# Patient Record
Sex: MALE | Race: WHITE | Employment: OTHER | URBAN - METROPOLITAN AREA
[De-identification: names, ages, dates, MRNs, and addresses within clinical notes are randomized per-mention and may not be internally consistent; named-entity substitution may affect disease eponyms.]

---

## 2017-01-03 ENCOUNTER — CLINICAL SUPPORT (OUTPATIENT)
Dept: CARDIOLOGY CLINIC | Age: 82
End: 2017-01-03

## 2017-01-03 DIAGNOSIS — Z79.01 LONG TERM (CURRENT) USE OF ANTICOAGULANTS: Primary | ICD-10-CM

## 2017-01-03 DIAGNOSIS — I48.0 PAROXYSMAL ATRIAL FIBRILLATION (HCC): ICD-10-CM

## 2017-01-03 LAB
INR BLD: 3.1
PT POC: 39.8 SEC
VALID INTERNAL CONTROL?: YES

## 2017-02-06 ENCOUNTER — CLINICAL SUPPORT (OUTPATIENT)
Dept: CARDIOLOGY CLINIC | Age: 82
End: 2017-02-06

## 2017-02-06 ENCOUNTER — OFFICE VISIT (OUTPATIENT)
Dept: CARDIOLOGY CLINIC | Age: 82
End: 2017-02-06

## 2017-02-06 VITALS
RESPIRATION RATE: 18 BRPM | SYSTOLIC BLOOD PRESSURE: 140 MMHG | OXYGEN SATURATION: 96 % | WEIGHT: 203 LBS | BODY MASS INDEX: 30.06 KG/M2 | HEART RATE: 75 BPM | DIASTOLIC BLOOD PRESSURE: 60 MMHG

## 2017-02-06 DIAGNOSIS — I87.2 VENOUS INSUFFICIENCY: ICD-10-CM

## 2017-02-06 DIAGNOSIS — I50.32 CHRONIC DIASTOLIC HEART FAILURE (HCC): ICD-10-CM

## 2017-02-06 DIAGNOSIS — Z95.0 PACEMAKER: ICD-10-CM

## 2017-02-06 DIAGNOSIS — I11.0 BENIGN HYPERTENSIVE HEART DISEASE WITH HEART FAILURE (HCC): ICD-10-CM

## 2017-02-06 DIAGNOSIS — Z79.01 LONG TERM (CURRENT) USE OF ANTICOAGULANTS: Primary | ICD-10-CM

## 2017-02-06 DIAGNOSIS — I25.10 CORONARY ARTERY DISEASE INVOLVING NATIVE CORONARY ARTERY OF NATIVE HEART WITHOUT ANGINA PECTORIS: Primary | Chronic | ICD-10-CM

## 2017-02-06 DIAGNOSIS — E78.5 DYSLIPIDEMIA: ICD-10-CM

## 2017-02-06 DIAGNOSIS — I48.0 PAROXYSMAL ATRIAL FIBRILLATION (HCC): ICD-10-CM

## 2017-02-06 DIAGNOSIS — N18.30 CHRONIC RENAL DISEASE, STAGE III (HCC): Chronic | ICD-10-CM

## 2017-02-06 LAB
INR BLD: 3
PT POC: 41.8 SEC
VALID INTERNAL CONTROL?: YES

## 2017-02-06 NOTE — MR AVS SNAPSHOT
Visit Information Date & Time Provider Department Dept. Phone Encounter #  
 2/6/2017  3:00 PM Zion Campoverde MD [facility] Aleksandra Fix [phone] [id number] Follow-up Instructions Return in about 6 months (around 8/6/2017). Your Appointments 2/6/2017  3:00 PM  
ESTABLISHED PATIENT with Zion Campoverde MD  
[facility] ([facility] SCHEDULING) Appt Note: 4 mo fup  
 [address] [facility] [address] [facility] [address]  
[phone]  
  
   
 [address] [address]  
  
    
 2/6/2017  3:20 PM  
COUMADIN CLINIC with COUMADIN, STFRANCIS  
[facility] Appt Note: f/u sov$0 crf 01/03/17  
 [address] [facility] [address]  
[phone]  
  
   
 [address] [address]  
  
    
 3/1/2017  2:40 PM  
COUMADIN CLINIC with COUMADIN, STFRANCIS  
[facility] ([facility]) Appt Note: f/u sov$0 crf 02/06/17  
 [address]  
[phone]  
  
    
 3/1/2017  2:45 PM  
PACEMAKER with PACEMAKER, STFRANCES  
[facility] ([facility] SCHEDULING) Appt Note: stj thresholds/stf/b 12-5-16  
 354 [facility] [address]  
[phone]  
  
   
 354 [facility] [address] [place] [address]  
  
    
 3/1/2017  3:20 PM  
ESTABLISHED PATIENT with Dano Felix MD  
[facility] ([facility] Appt Note: 6 month follow up  
 [address]  
54 [facility] [address] [address] Upcoming Health Maintenance Date Due DTaP/Tdap/Td series (1 - Tdap) 2/23/1953 ZOSTER VACCINE AGE 60> 2/23/1992 GLAUCOMA SCREENING Q2Y 2/23/1997 MEDICARE YEARLY EXAM 2/23/1997 Pneumococcal 65+ High/Highest Risk (2 of 2 - PPSV23) 1/1/2011 INFLUENZA AGE 9 TO ADULT 8/1/2016 Allergies as of 2/6/2017  Review Complete On: 2/6/2017 By: Radha Thakkar LPN Severity Noted Reaction Type Reactions Iron High 03/07/2013    Anaphylaxis Bumex [Bumetanide]  09/14/2010   Systemic Rash Current Immunizations  Reviewed on 3/5/2011 Name Date Influenza Vaccine Whole 11/1/2010 Pneumococcal Vaccine (Unspecified Type) 1/1/2006 Not reviewed this visit You Were Diagnosed With   
  
 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 
ICD-9-CM: 428.32 Venous insufficiency     ICD-10-CM: I87.2 ICD-9-CM: 459.81 Vitals BP Pulse Resp Weight(growth percentile) SpO2 BMI  
 140/60 (BP 1 Location: Left arm, BP Patient Position: At rest) 75 18 203 lb (92.1 kg) 96% 30.06 kg/m2 Smoking Status Former Smoker Vitals History BMI and BSA Data Body Mass Index Body Surface Area 30.06 kg/m 2 2.12 m 2 Preferred Pharmacy Pharmacy Name Phone [address] [facility] [phone] Your Updated Medication List  
  
   
This list is accurate as of: 2/6/17  2:37 PM.  Always use your most recent med list.  
  
  
  
  
 aspirin 81 mg tablet Take  by mouth daily. calcitRIOL 0.25 mcg capsule Commonly known as:  ROCALTROL Take 0.25 mcg by mouth daily. cloNIDine HCl 0.2 mg tablet Commonly known as:  CATAPRES Take 1 Tab by mouth two (2) times a day. diphenhydrAMINE 25 mg capsule Commonly known as:  BENADRYL Take 25 mg by mouth nightly as needed. ethacrynic acid 25 mg tablet Commonly known as:  EDECRIN Take 50 mg by mouth daily. FERROUS SULFATE Take 65 mg by mouth. furosemide 40 mg tablet Commonly known as:  LASIX Take 0.5 Tabs by mouth daily. Indications: PULMONARY EDEMA DUE TO CHRONIC HEART FAILURE  
  
 labetalol 200 mg tablet Commonly known as:  Marlane Angelucci Take 1 Tab by mouth two (2) times a day. levothyroxine 50 mcg tablet Commonly known as:  SYNTHROID Take  by mouth Daily (before breakfast). losartan 25 mg tablet Commonly known as:  COZAAR Take 50 mg by mouth daily. SPS (WITH SORBITOL) 15 gram/60 mL suspension Generic drug:  sodium polystyrene Take 15 g by mouth See Admin Instructions. 3 times weekly VITAMIN D3 1,000 unit tablet Generic drug:  cholecalciferol Take 50,000 Units by mouth daily. 50,000 iu every two weeks  
  
 warfarin 5 mg tablet Commonly known as:  JANTOVEN  
TAKE 1 TABLET BY MOUTH DAILY. OR AS DIRECTED  
  
 ZOCOR 20 mg tablet Generic drug:  simvastatin Take  by mouth nightly. Follow-up Instructions Return in about 6 months (around 8/6/2017). Introducing \[facility] [place] [facility]\"" & HEALTH SERVICES! Joanne Jackson introduces anfix patient portal. Now you can access parts of your medical record, email your doctor's office, and request medication refills online. 1. In your internet browser, go to [email or web address]). [email or web address] ([email or web address]) 2. Click on the First Time User? Click Here link in the Sign In box. You will see the New Member Sign Up page. 3. Enter your [employer] Access Code exactly as it appears below. You will not need to use this code after youve completed the sign-up process. If you do not sign up before the expiration date, you must request a new code. · anfix Access Code: [email or web address] Expires: 5/7/2017  2:36 PM 
 
 4. Enter the last four digits of your Social Security Number (xxxx) and Date of Birth (mm/dd/yyyy) as indicated and click Submit. You will be taken to the next sign-up page. 5. Create a [employer] ID. This will be your [employer] login ID and cannot be changed, so think of one that is secure and easy to remember. 6. Create a [employer] password. You can change your password at any time. 7. Enter your Password Reset Question and Answer. This can be used at a later time if you forget your password. 8. Enter your e-mail address. You will receive e-mail notification when new information is available in [address] 9. Click Sign Up. You can now view and download portions of your medical record. 10. Click the Download Summary menu link to download a portable copy of your medical information. If you have questions, please visit the Frequently Asked Questions section of the [employer] website. Remember, [employer] is NOT to be used for urgent needs. For medical emergencies, dial 911. Now available from your iPhone and Android! Please provide this summary of care documentation to your next provider. Your primary care clinician is listed as Alvin Bethea. If you have any questions after today's visit, please call [phone].

## 2017-02-06 NOTE — PROGRESS NOTES
Farooq JEFFREYDeann Saha [address], [address]  [address]  [address]  [place], [address] [facility]    Office [phone]  Fax [phone]  Cell [phone]    HISTORY OF PRESENT ILLNESS  Henrique Flores is a 80 y.o. male. Last seen 2 months ago. Past Medical History   Diagnosis Date    Advanced heart block      [facility] pacemaker Jan 2011    Atrial fibrillation [facility])      discovered Sept 2011    CAD (coronary artery disease), native coronary artery      Diastolic HF, no angina, 2v CABG 3/2/11 [facility]), LIMA-LAD, VG-OM    Chronic kidney disease     Hypercholesterolemia     Hypertension     S/P CABG x 2 3/2011     Dr Andrew Gardner    Symptomatic bradycardia      pacer     Cardiac testing  2v CABG 3/2/11 [facility]), LIMA-LAD, VG-OM   Advanced heart block - [facility] pacemaker Jan 2011   Cardiac catheterization 2/24/11- Ostial LM 60%, documented by IVUS, mild LAD plaque. Circ and RCA normal. No LVG. EDP 30, wedge 20, RA 14.   Echo 2d adult 2/2011 - LVH, EF 60%   Echo 2d adult 9/1/11- LVH, EF 45%, mild NEFTALY   Lexiscan cardiolite 9/10/13 - normal perfusion, EF 47%  Echo 2/5/14 - EF 40%, grade 3 diastolic dysfunction, ventricular septal paradoxical motion, mild to mod LAE, mild MR, mod PA HTN. Echo 7/11/16 - EF 40 % to 45 %. Moderate hypokinesis of the apical wall(s). Moderate LAE. Dilated right atrium. Mild MAC with mild to moderate MR. AoV sclerosis without stenosis. Moderate TR with PASP 51mmHg. Moderate Pulm HTN. Interval decline in LVEF noted. PA systolic pressure has increased when compared to 05-Feb-2014. Lexiscan Cardiolite 7/11/16 - Normal perfusion. EF 51%. HPI    He is doing well with no interval issues. He has stable pattern of TIMMONS with climbing stairs. He denies any increased bilateral LE edema with Lasix. He self-adjusted his dosage to half tablet Lasix 20mg, but would like to discuss whether he should go back to whole tablet. He does not note any changes in his weight.  He is consistent with pacemaker checks and follow ups with Dr. Steve Wheeler. Patient denies any exertional chest pain, dyspnea, palpitations, syncope, orthopnea, edema or paroxysmal nocturnal dyspnea. Current Outpatient Prescriptions   Medication Sig Dispense Refill    furosemide (LASIX) 40 mg tablet Take 0.5 Tabs by mouth daily. Indications: PULMONARY EDEMA DUE TO CHRONIC HEART FAILURE 90 Tab 3    ethacrynic acid (EDECRIN) 25 mg tablet Take 50 mg by mouth daily.  diphenhydrAMINE (BENADRYL) 25 mg capsule Take 25 mg by mouth nightly as needed.  warfarin (JANTOVEN) 5 mg tablet TAKE 1 TABLET BY MOUTH DAILY. OR AS DIRECTED 90 Tab 3    labetalol (NORMODYNE) 200 mg tablet Take 1 Tab by mouth two (2) times a day. 180 Tab 3    cloNIDine HCl (CATAPRES) 0.2 mg tablet Take 1 Tab by mouth two (2) times a day. 180 Tab 3    FERROUS SULFATE Take 65 mg by mouth.  levothyroxine (SYNTHROID) 50 mcg tablet Take  by mouth Daily (before breakfast).  sodium polystyrene (SPS) 15 gram/60 mL suspension Take 15 g by mouth See Admin Instructions. 3 times weekly      calcitRIOL (ROCALTROL) 0.25 mcg capsule Take 0.25 mcg by mouth daily.  losartan (COZAAR) 25 mg tablet Take 50 mg by mouth daily.  cholecalciferol, vitamin d3, (VITAMIN D) 1,000 unit tablet Take 50,000 Units by mouth daily. 50,000 iu every two weeks       aspirin 81 mg tablet Take  by mouth daily.  simvastatin (ZOCOR) 20 mg tablet Take  by mouth nightly. Allergies   Allergen Reactions    Iron Anaphylaxis    Bumex [Bumetanide] Rash     Retired     Review of Systems  Constitutional: Negative for fever, chills, malaise/fatigue and diaphoresis. Respiratory: Negative for cough, hemoptysis, sputum production, shortness of breath and wheezing. Cardiovascular: Negative for chest pain, palpitations, orthopnea, claudication, leg swelling and PND.   Gastrointestinal: Negative for heartburn, nausea, vomiting, blood in stool and melena. Genitourinary: Negative for dysuria and flank pain. Musculoskeletal: Negative for joint pain and back pain. Skin: Negative for rash. Neurological: Negative for focal weakness, seizures, loss of consciousness, weakness and headaches. Positive for dizziness. Endo/Heme/Allergies: Does not bruise/bleed easily. Psychiatric/Behavioral: Negative for memory loss. The patient does not have insomnia. Visit Vitals    /60 (BP 1 Location: Left arm, BP Patient Position: At rest)    Pulse 75    Resp 18    Wt 203 lb (92.1 kg)    SpO2 96%    BMI 30.06 kg/m2     Wt Readings from Last 3 Encounters:   02/06/17 203 lb (92.1 kg)   10/10/16 210 lb (95.3 kg)   08/24/16 206 lb 6.4 oz (93.6 kg)     Physical Exam   Vitals reviewed. Constitutional: He is oriented to person, place, and time. He appears well-developed. Head: Normocephalic. Neck: Neck supple. No JVD present. No tracheal deviation present. Cardiovascular: Normal rate, regular rhythm, normal heart sounds and intact distal pulses. Exam reveals no gallop and no friction rub. No murmur heard. Sternotomy scar well healed. Pulmonary/Chest: left basal crackles. Abdominal: Bowel sounds are normal. No tenderness. He has no rebound. Musculoskeletal: He exhibits no edema  Neurological: He is alert and oriented to person, place, and time. Skin: Skin is warm and dry. Psychiatric: He has a normal mood and affect. Cardiographics  Echo March 2013 - EF 50%, prominent septal dyssynchrony  Echo 2/5/14 - EF 40%, prominent septal dyssynchrony, PA sys 50  CXR 1/27/14 - bilateral effusions, left > right  Labs 1/28/13 - , Cr 2.03, K 4.2  EKG 8/9/16 - V paced with likely underlying AF    ASSESSMENT and PLAN  Encounter Diagnoses   Name Primary?    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 Nicole Monge has CAD s/p CABG 9117, complicated by AF/conduction disease s/p ppm. Stress nuclear study 6 months ago was normal. Echo at that time demonstrated LVEF 40% with moderate PA HTN. He has chronic class 2 HF symptoms on current diuretic regimen. We discussed dose adjustment PRN weight gain. AF is rate controlled. Continue regular pace maker checks. Will extend his visit to 6 months and re-assess LV function with echo. Follow-up Disposition:  Return in about 6 months (around 8/6/2017). Echo with next visit.      Written by Tao Aviles, as dictated by Alex Monge MD.   Alex Monge MD

## 2017-02-12 PROBLEM — Z95.0 PACEMAKER: Status: ACTIVE | Noted: 2017-02-12

## 2017-03-01 ENCOUNTER — CLINICAL SUPPORT (OUTPATIENT)
Dept: CARDIOLOGY CLINIC | Age: 82
End: 2017-03-01

## 2017-03-01 ENCOUNTER — OFFICE VISIT (OUTPATIENT)
Dept: CARDIOLOGY CLINIC | Age: 82
End: 2017-03-01

## 2017-03-01 VITALS
HEART RATE: 65 BPM | BODY MASS INDEX: 30.96 KG/M2 | WEIGHT: 209 LBS | HEIGHT: 69 IN | OXYGEN SATURATION: 91 % | DIASTOLIC BLOOD PRESSURE: 70 MMHG | SYSTOLIC BLOOD PRESSURE: 130 MMHG

## 2017-03-01 DIAGNOSIS — I48.0 PAROXYSMAL ATRIAL FIBRILLATION (HCC): Primary | ICD-10-CM

## 2017-03-01 DIAGNOSIS — I48.0 PAROXYSMAL ATRIAL FIBRILLATION (HCC): ICD-10-CM

## 2017-03-01 DIAGNOSIS — Z95.0 CARDIAC PACEMAKER IN SITU: Primary | ICD-10-CM

## 2017-03-01 DIAGNOSIS — Z79.01 LONG TERM (CURRENT) USE OF ANTICOAGULANTS: Primary | ICD-10-CM

## 2017-03-01 LAB
INR BLD: 3
PT POC: 40.2 SEC
VALID INTERNAL CONTROL?: YES

## 2017-03-01 NOTE — PROGRESS NOTES
HISTORY OF PRESENTING ILLNESS      Aggie Lucas is a 80 y.o. male established patient with history of AF, CAD/CABG, pacemaker, HTN, CKD, hyperlipidemia, chronic SOB, LVEF ranging between 40-50% here for follow up of PPM. His PPM is now at [facility] since January. He is currently on Coumadin for CVA risk reduction. He is otherwise doing well. 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) 04/04/2011    Chronic diastolic heart failure ([facility] [address] 04/04/2011    Coronary artery disease 03/01/2011    Heart block 09/17/2010    Chronic renal disease, stage III 09/14/2010    Venous insufficiency 09/14/2010           PAST MEDICAL HISTORY     Past Medical History:   Diagnosis Date    Advanced heart block     [facility] Robert pacemaker Jan 2011    Atrial fibrillation [facility])     discovered Sept 2011    CAD (coronary artery disease), native coronary artery     Diastolic HF, no angina, 2v CABG 3/2/11 [facility]), LIMA-LAD, VG-OM    Chronic kidney disease     Hypercholesterolemia     Hypertension     S/P CABG x 2 3/2011    Dr Scar Gautam Symptomatic bradycardia     pacer           PAST SURGICAL HISTORY     Past Surgical History:   Procedure Laterality Date    CARDIAC CATHETERIZATION  2/24/11    Ostial LM 60%, documented by IVUS, mild LAD plaque. Circ and RCA normal. No LVG. EDP 30, wedge 20, RA 14.     ECHO 2D ADULT  2/2011    LVH, EF 60%    ECHO 2D ADULT  9/1/11    LVH, EF 45%, mild NEFTALY          ALLERGIES     Allergies   Allergen Reactions    Iron Anaphylaxis    Bumex [Bumetanide] Rash          FAMILY HISTORY     No family history on file.  negative for cardiac disease       SOCIAL HISTORY     Social History     Social History    Marital status:      Spouse name: N/A    Number of children: N/A    Years of education: N/A     Social History Main Topics    Smoking status: Former Smoker     Quit date: 1/14/1988    Smokeless tobacco: Never Used      Comment: stopped 1988    Alcohol use No    Drug use: None    Sexual activity: Not Asked     Other Topics Concern    None     Social History Narrative         MEDICATIONS     Current Outpatient Prescriptions   Medication Sig    furosemide (LASIX) 40 mg tablet Take 0.5 Tabs by mouth daily. Indications: PULMONARY EDEMA DUE TO CHRONIC HEART FAILURE    ethacrynic acid (EDECRIN) 25 mg tablet Take 50 mg by mouth daily.  diphenhydrAMINE (BENADRYL) 25 mg capsule Take 25 mg by mouth nightly as needed.  warfarin (JANTOVEN) 5 mg tablet TAKE 1 TABLET BY MOUTH DAILY. OR AS DIRECTED    labetalol (NORMODYNE) 200 mg tablet Take 1 Tab by mouth two (2) times a day.  cloNIDine HCl (CATAPRES) 0.2 mg tablet Take 1 Tab by mouth two (2) times a day.  FERROUS SULFATE Take 65 mg by mouth.  levothyroxine (SYNTHROID) 50 mcg tablet Take  by mouth Daily (before breakfast).  sodium polystyrene (SPS) 15 gram/60 mL suspension Take 15 g by mouth See Admin Instructions. 3 times weekly    calcitRIOL (ROCALTROL) 0.25 mcg capsule Take 0.25 mcg by mouth daily.  losartan (COZAAR) 25 mg tablet Take 50 mg by mouth daily.  cholecalciferol, vitamin d3, (VITAMIN D) 1,000 unit tablet Take 50,000 Units by mouth daily. 50,000 iu every two weeks     aspirin 81 mg tablet Take  by mouth daily.  simvastatin (ZOCOR) 20 mg tablet Take  by mouth nightly. No current facility-administered medications for this visit. I have reviewed the nurses notes, vitals, problem list, allergy list, medical history, family, social history and medications. REVIEW OF SYMPTOMS      General: Pt denies excessive weight gain or loss. Pt is able to conduct ADL's  HEENT: Denies blurred vision, headaches, hearing loss, epistaxis and difficulty swallowing.   Respiratory: Denies cough, congestion, shortness of breath, TIMMONS, wheezing or stridor. Cardiovascular: Denies precordial pain, palpitations, edema or PND  Gastrointestinal: Denies poor appetite, indigestion, abdominal pain or blood in stool  Genitourinary: Denies hematuria, dysuria, increased urinary frequency  Musculoskeletal: Denies joint pain or swelling from muscles or joints  Neurologic: Denies tremor, paresthesias, headache, or sensory motor disturbance  Psychiatric: Denies confusion, insomnia, depression  Integumentray: Denies rash, itching or ulcers. Hematologic: Denies easy bruising, bleeding       PHYSICAL EXAMINATION      Vitals:    03/01/17 1503   BP: 130/70   Pulse: 65   SpO2: 91%   Weight: 209 lb (94.8 kg)   Height: 5' 8.9\" (1.75 m)     General: Well developed, in no acute distress. HEENT: No jaundice, oral mucosa moist, no oral ulcers  Neck: Supple, no stiffness, no lymphadenopathy, supple  Heart:  Normal S1/S2 negative S3 or S4. Regular, no murmur, gallop or rub, no jugular venous distention  Respiratory: Clear bilaterally x 4, no wheezing or rales  Abdomen:   Soft, non-tender, bowel sounds are active.   Extremities:  No edema, normal cap refill, no cyanosis. Musculoskeletal: No clubbing, no deformities  Neuro: A&Ox3, speech clear, gait stable, cooperative, no focal neurologic deficits  Skin: Skin color is normal. No rashes or lesions.  Non diaphoretic, moist.  Vascular: 2+ pulses symmetric in all extremities       DIAGNOSTIC DATA      EKG: Apacing       LABORATORY DATA      Lab Results   Component Value Date/Time    WBC 14.3 03/08/2011 03:40 AM    Hemoglobin (POC) 10.2 02/07/2011 05:55 PM    HGB 7.7 03/08/2011 03:40 AM    Hematocrit (POC) 30 02/07/2011 05:55 PM    HCT 23.8 03/08/2011 03:40 AM    PLATELET 857 91/45/8447 03:40 AM    MCV 89.5 03/08/2011 03:40 AM      Lab Results   Component Value Date/Time    Sodium 145 09/19/2016 01:03 PM    Potassium 4.3 09/19/2016 01:03 PM    Chloride 99 09/19/2016 01:03 PM    CO2 29 09/19/2016 01:03 PM    Anion gap 9 03/08/2011 03:40 AM    Glucose 84 09/19/2016 01:03 PM    BUN 43 09/19/2016 01:03 PM    Creatinine 2.89 09/19/2016 01:03 PM    BUN/Creatinine ratio 15 09/19/2016 01:03 PM    GFR est AA 22 09/19/2016 01:03 PM    GFR est non-AA 19 09/19/2016 01:03 PM    Calcium 9.3 09/19/2016 01:03 PM    Bilirubin, total 0.5 03/08/2011 03:40 AM    AST (SGOT) 43 03/08/2011 03:40 AM    Alk. phosphatase 64 03/08/2011 03:40 AM    Protein, total 6.4 03/08/2011 03:40 AM    Albumin 2.9 03/08/2011 03:40 AM    Globulin 3.5 03/08/2011 03:40 AM    A-G Ratio 0.8 03/08/2011 03:40 AM    ALT (SGPT) 7 03/08/2011 03:40 AM           ASSESSMENT      1. Atrial fibrillation   2. CAD/CABG  3. Pacemaker   4. Hypertension  5. Chronic kidney disease  6. Hyperlipidemia         PLAN     Plan for SJM PPM generator change. Hold coumadin 1 day prior. FOLLOW-UP     1 week post procedure    Thank you, Rodrick Flores MD and Dr. Ally Carroll for allowing me to participate in the care of this extraordinarily pleasant male. Please do not hesitate to contact me for further questions/concerns.          Lee Ann Torres MD  Cardiac Electrophysiology / Cardiology    [address] [place] [address]  [facility]     Cynthia Fox  [phone] / [phone] Fax   [phone] / [phone] Fax

## 2017-03-01 NOTE — PROGRESS NOTES
Visit Vitals    /70 (BP 1 Location: Left arm, BP Patient Position: Sitting)    Pulse 65    Ht 5' 8.9\" (1.75 m)    Wt 209 lb (94.8 kg)    SpO2 91%    BMI 30.95 kg/m2

## 2017-03-01 NOTE — PATIENT INSTRUCTIONS
You are scheduled for a pacemaker generator change at [facility] on Friday, March 10th. Please arrive at the patient registration desk located on the 2nd floor of the main building at 11:00am.    Do not eat or drink anything after midnight the night before your procedure. You will need a . You may take your normal medications with a sip of water the morning of your procedure. Except for the following: Coumadin  Blood thinner instructions: Hold Coumadin 1 day prior to procedure. Lab instructions: Please have labs drawn 2-3 days prior to scheduled procedure. Please call Yang Alegria or Ann-Marie Plata if you have any questions at [phone]. Please call the office if you develop any type of illness prior to your procedure. Please contact our business office at [phone] with any financial concerns.

## 2017-03-02 PROBLEM — Z45.010 ELECTIVE REPLACEMENT INDICATED FOR CARDIAC PACEMAKER BATTERY AT END OF LIFESPAN: Status: ACTIVE | Noted: 2017-03-02

## 2017-03-07 ENCOUNTER — HOSPITAL ENCOUNTER (OUTPATIENT)
Dept: LAB | Age: 82
Discharge: HOME | End: 2017-03-07
Payer: MEDICARE

## 2017-03-07 PROCEDURE — 80048 BASIC METABOLIC PNL TOTAL CA: CPT

## 2017-03-07 PROCEDURE — 85027 COMPLETE CBC AUTOMATED: CPT

## 2017-03-07 PROCEDURE — 85610 PROTHROMBIN TIME: CPT

## 2017-03-07 PROCEDURE — 36415 COLL VENOUS BLD VENIPUNCTURE: CPT

## 2017-03-08 LAB
BUN SERPL-MCNC: 39 MG/DL (ref 8–27)
BUN/CREAT SERPL: 16 (ref 10–22)
CALCIUM SERPL-MCNC: 9 MG/DL (ref 8.6–10.2)
CHLORIDE SERPL-SCNC: 101 MMOL/L (ref 96–106)
CO2 SERPL-SCNC: 24 MMOL/L (ref 18–29)
CREAT SERPL-MCNC: 2.45 MG/DL (ref 0.76–1.27)
ERYTHROCYTE [DISTWIDTH] IN BLOOD BY AUTOMATED COUNT: 16.5 % (ref 12.3–15.4)
GLUCOSE SERPL-MCNC: 117 MG/DL (ref 65–99)
HCT VFR BLD AUTO: 32 % (ref 37.5–51)
HGB BLD-MCNC: 10.4 G/DL (ref 12.6–17.7)
INR PPP: 2.6 (ref 0.8–1.2)
INTERPRETATION: NORMAL
MCH RBC QN AUTO: 28.6 PG (ref 26.6–33)
MCHC RBC AUTO-ENTMCNC: 32.5 G/DL (ref 31.5–35.7)
MCV RBC AUTO: 88 FL (ref 79–97)
PLATELET # BLD AUTO: 264 X10E3/UL (ref 150–379)
POTASSIUM SERPL-SCNC: 4.1 MMOL/L (ref 3.5–5.2)
PROTHROMBIN TIME: 26.9 SEC (ref 9.1–12)
RBC # BLD AUTO: 3.64 X10E6/UL (ref 4.14–5.8)
SODIUM SERPL-SCNC: 144 MMOL/L (ref 134–144)
WBC # BLD AUTO: 9 X10E3/UL (ref 3.4–10.8)

## 2017-03-09 RX ORDER — SODIUM CHLORIDE 0.9 % (FLUSH) 0.9 %
5-10 SYRINGE (ML) INJECTION AS NEEDED
Status: CANCELLED | OUTPATIENT
Start: 2017-03-10

## 2017-03-09 RX ORDER — SODIUM CHLORIDE 0.9 % (FLUSH) 0.9 %
5-10 SYRINGE (ML) INJECTION EVERY 8 HOURS
Status: CANCELLED | OUTPATIENT
Start: 2017-03-10

## 2017-03-10 ENCOUNTER — HOSPITAL ENCOUNTER (OUTPATIENT)
Dept: NON INVASIVE DIAGNOSTICS | Age: 82
Discharge: HOME | End: 2017-03-10
Attending: INTERNAL MEDICINE | Admitting: INTERNAL MEDICINE
Payer: MEDICARE

## 2017-03-10 VITALS
RESPIRATION RATE: 23 BRPM | WEIGHT: 205.25 LBS | DIASTOLIC BLOOD PRESSURE: 51 MMHG | HEIGHT: 70 IN | TEMPERATURE: 97.6 F | BODY MASS INDEX: 29.38 KG/M2 | SYSTOLIC BLOOD PRESSURE: 147 MMHG | OXYGEN SATURATION: 100 % | HEART RATE: 70 BPM

## 2017-03-10 LAB — INR BLD: 2.5 (ref 0.9–1.2)

## 2017-03-10 PROCEDURE — 74011250636 HC RX REV CODE- 250/636: Performed by: INTERNAL MEDICINE

## 2017-03-10 PROCEDURE — 74011000250 HC RX REV CODE- 250: Performed by: INTERNAL MEDICINE

## 2017-03-10 PROCEDURE — 74011250636 HC RX REV CODE- 250/636

## 2017-03-10 PROCEDURE — C1785 PMKR, DUAL, RATE-RESP: HCPCS | Performed by: INTERNAL MEDICINE

## 2017-03-10 PROCEDURE — 33228 REMV&REPLC PM GEN DUAL LEAD: CPT

## 2017-03-10 PROCEDURE — 77030018729 HC ELECTRD DEFIB PAD CARD -B

## 2017-03-10 PROCEDURE — 77030012935 HC DRSG AQUACEL BMS -B

## 2017-03-10 PROCEDURE — 77030031139 HC SUT VCRL2 J&J -A

## 2017-03-10 PROCEDURE — 99153 MOD SED SAME PHYS/QHP EA: CPT | Performed by: INTERNAL MEDICINE

## 2017-03-10 PROCEDURE — 77030018673

## 2017-03-10 PROCEDURE — 77030028698 HC BLD TISS PLSM MEDT -D

## 2017-03-10 PROCEDURE — 85610 PROTHROMBIN TIME: CPT

## 2017-03-10 PROCEDURE — 74011000272 HC RX REV CODE- 272: Performed by: INTERNAL MEDICINE

## 2017-03-10 PROCEDURE — 99152 MOD SED SAME PHYS/QHP 5/>YRS: CPT | Performed by: INTERNAL MEDICINE

## 2017-03-10 PROCEDURE — 77030011640 HC PAD GRND REM COVD -A

## 2017-03-10 PROCEDURE — 77030002933 HC SUT MCRYL J&J -A

## 2017-03-10 RX ORDER — SODIUM CHLORIDE 0.9 % (FLUSH) 0.9 %
5-10 SYRINGE (ML) INJECTION EVERY 8 HOURS
Status: CANCELLED | OUTPATIENT
Start: 2017-03-10

## 2017-03-10 RX ORDER — SODIUM CHLORIDE 0.9 % (FLUSH) 0.9 %
5-10 SYRINGE (ML) INJECTION AS NEEDED
Status: DISCONTINUED | OUTPATIENT
Start: 2017-03-10 | End: 2017-03-10 | Stop reason: HOSPADM

## 2017-03-10 RX ORDER — HYDROCODONE BITARTRATE AND ACETAMINOPHEN 5; 325 MG/1; MG/1
1 TABLET ORAL
Status: CANCELLED | OUTPATIENT
Start: 2017-03-10

## 2017-03-10 RX ORDER — LORAZEPAM 2 MG/ML
INJECTION INTRAMUSCULAR
Status: COMPLETED
Start: 2017-03-10 | End: 2017-03-10

## 2017-03-10 RX ORDER — SODIUM CHLORIDE 0.9 % (FLUSH) 0.9 %
5-10 SYRINGE (ML) INJECTION AS NEEDED
Status: CANCELLED | OUTPATIENT
Start: 2017-03-10

## 2017-03-10 RX ORDER — ACETAMINOPHEN 325 MG/1
650 TABLET ORAL
Status: CANCELLED | OUTPATIENT
Start: 2017-03-10

## 2017-03-10 RX ORDER — CEFAZOLIN SODIUM IN 0.9 % NACL 2 G/50 ML
2 INTRAVENOUS SOLUTION, PIGGYBACK (ML) INTRAVENOUS ONCE
Status: COMPLETED | OUTPATIENT
Start: 2017-03-10 | End: 2017-03-10

## 2017-03-10 RX ORDER — LORAZEPAM 2 MG/ML
1 INJECTION INTRAMUSCULAR ONCE
Status: COMPLETED | OUTPATIENT
Start: 2017-03-10 | End: 2017-03-10

## 2017-03-10 RX ORDER — CEPHALEXIN 500 MG/1
500 CAPSULE ORAL 3 TIMES DAILY
Qty: 15 CAP | Refills: 0 | Status: SHIPPED | OUTPATIENT
Start: 2017-03-10 | End: 2017-03-15

## 2017-03-10 RX ORDER — MIDAZOLAM HYDROCHLORIDE 1 MG/ML
.5-1 INJECTION, SOLUTION INTRAMUSCULAR; INTRAVENOUS
Status: DISCONTINUED | OUTPATIENT
Start: 2017-03-10 | End: 2017-03-10 | Stop reason: HOSPADM

## 2017-03-10 RX ORDER — ONDANSETRON 2 MG/ML
4 INJECTION INTRAMUSCULAR; INTRAVENOUS
Status: CANCELLED | OUTPATIENT
Start: 2017-03-10

## 2017-03-10 RX ORDER — FENTANYL CITRATE 50 UG/ML
25-200 INJECTION, SOLUTION INTRAMUSCULAR; INTRAVENOUS
Status: DISCONTINUED | OUTPATIENT
Start: 2017-03-10 | End: 2017-03-10 | Stop reason: HOSPADM

## 2017-03-10 RX ORDER — SODIUM CHLORIDE 0.9 % (FLUSH) 0.9 %
5-10 SYRINGE (ML) INJECTION EVERY 8 HOURS
Status: DISCONTINUED | OUTPATIENT
Start: 2017-03-10 | End: 2017-03-10 | Stop reason: HOSPADM

## 2017-03-10 RX ORDER — GENTAMICIN SULFATE 80 MG/100ML
80 INJECTION, SOLUTION INTRAVENOUS ONCE
Status: COMPLETED | OUTPATIENT
Start: 2017-03-10 | End: 2017-03-10

## 2017-03-10 RX ORDER — LIDOCAINE HYDROCHLORIDE 10 MG/ML
10-30 INJECTION INFILTRATION; PERINEURAL
Status: DISCONTINUED | OUTPATIENT
Start: 2017-03-10 | End: 2017-03-10 | Stop reason: HOSPADM

## 2017-03-10 RX ORDER — HEPARIN SODIUM 200 [USP'U]/100ML
500 INJECTION, SOLUTION INTRAVENOUS ONCE
Status: COMPLETED | OUTPATIENT
Start: 2017-03-10 | End: 2017-03-10

## 2017-03-10 RX ORDER — BUPIVACAINE HYDROCHLORIDE 5 MG/ML
10-20 INJECTION, SOLUTION EPIDURAL; INTRACAUDAL
Status: DISCONTINUED | OUTPATIENT
Start: 2017-03-10 | End: 2017-03-10 | Stop reason: HOSPADM

## 2017-03-10 RX ORDER — LIDOCAINE HYDROCHLORIDE AND EPINEPHRINE 10; 10 MG/ML; UG/ML
.5-4 INJECTION, SOLUTION INFILTRATION; PERINEURAL
Status: DISCONTINUED | OUTPATIENT
Start: 2017-03-10 | End: 2017-03-10

## 2017-03-10 RX ADMIN — HEPARIN SODIUM 1000 UNITS: 200 INJECTION, SOLUTION INTRAVENOUS at 14:15

## 2017-03-10 RX ADMIN — FENTANYL CITRATE 50 MCG: 50 INJECTION, SOLUTION INTRAMUSCULAR; INTRAVENOUS at 14:05

## 2017-03-10 RX ADMIN — MIDAZOLAM HYDROCHLORIDE 2 MG: 1 INJECTION, SOLUTION INTRAMUSCULAR; INTRAVENOUS at 14:06

## 2017-03-10 RX ADMIN — GENTAMICIN SULFATE 80 MG: 80 INJECTION, SOLUTION INTRAVENOUS at 14:36

## 2017-03-10 RX ADMIN — LORAZEPAM 1 MG: 2 INJECTION, SOLUTION INTRAMUSCULAR; INTRAVENOUS at 13:17

## 2017-03-10 RX ADMIN — CEFAZOLIN 2 G: 1 INJECTION, POWDER, FOR SOLUTION INTRAMUSCULAR; INTRAVENOUS; PARENTERAL at 14:02

## 2017-03-10 RX ADMIN — LORAZEPAM 1 MG: 2 INJECTION INTRAMUSCULAR at 13:17

## 2017-03-10 RX ADMIN — SODIUM CHLORIDE: 900 IRRIGANT IRRIGATION at 14:15

## 2017-03-10 RX ADMIN — LIDOCAINE HYDROCHLORIDE 10 ML: 10 INJECTION, SOLUTION INFILTRATION; PERINEURAL at 14:05

## 2017-03-10 RX ADMIN — BUPIVACAINE HYDROCHLORIDE 50 MG: 5 INJECTION, SOLUTION EPIDURAL; INTRACAUDAL at 14:05

## 2017-03-10 RX ADMIN — MIDAZOLAM HYDROCHLORIDE 1 MG: 1 INJECTION, SOLUTION INTRAMUSCULAR; INTRAVENOUS at 14:29

## 2017-03-10 NOTE — PROGRESS NOTES
Patient received ambulatory from waiting area, verbalizes understanding for scheduled procedure, questions answered, and consent signed. 1310 Pt verbalizes that he is fearful of his procedure, feels anxious, and is tearful. 26 Dr. Hood Like in to speak with pt, answered questions, and verbal order received. 1315 Representative from [address] [place] [address] at the bedside and reviewing the home device for monitoring his pacer from home. His wife is at the bedside as well. Opportunity for questions was offered. Pt & his spouse verbalize understanding. 1345 TRANSFER - OUT REPORT:    Verbal report given to Caroline Arriola (name) on Alis Greer  being transferred to EP Lab (unit) for ordered procedure       Report consisted of patients Situation, Background, Assessment and   Recommendations(SBAR). Information from the following report(s) SBAR was reviewed with the receiving nurse. Lines:   Peripheral IV 03/10/17 Left Antecubital (Active)   Site Assessment Clean, dry, & intact; Ecchymotic (bruised); Swelling 3/10/2017  1:10 PM   Phlebitis Assessment 0 3/10/2017  1:10 PM   Infiltration Assessment 0 3/10/2017  1:10 PM   Dressing Status New 3/10/2017  1:10 PM   Dressing Type Tape;Transparent 3/10/2017  1:10 PM   Hub Color/Line Status Pink 3/10/2017  1:10 PM   Action Taken Open ports on tubing capped 3/10/2017  1:10 PM   Alcohol Cap Used Yes 3/10/2017  1:10 PM        Opportunity for questions and clarification was provided.       Patient transported with:   Registered Nurse  Tech

## 2017-03-10 NOTE — PROGRESS NOTES
TRANSFER - IN REPORT:    Verbal report received from [facility] on Yu Hy  being received from EP Lab for routine progression of care      Report consisted of patients Situation, Background, Assessment and   Recommendations(SBAR). Information from the following report(s) Procedure Summary and MAR was reviewed with the receiving nurse. Opportunity for questions and clarification was provided. Assessment completed upon patients arrival to unit and care assumed. 1525 Discharge instructions reviewed with the pt & his daughter. Hard copy of instructions handed to the daughter. 80 IV removed, pt's wife in to assist with getting dressed. 1545 Patient discharged via wheel chair through the second floor entrance of the [facility] with all of his personal belongings. Care of pt turned over to his family. Family member driving private vehicle.

## 2017-03-10 NOTE — H&P
HISTORY OF PRESENTING ILLNESS       Diana Mariee is a 80 y.o. male established patient with history of AF, CAD/CABG, pacemaker, HTN, CKD, hyperlipidemia, chronic SOB, LVEF ranging between 40-50% here for follow up of PPM. His PPM is now at [facility] since January. He is currently on Coumadin for CVA risk reduction. He is otherwise doing well.         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 ([facility] 04/04/2011    Chronic diastolic heart failure ([facility] 04/04/2011    Coronary artery disease 03/01/2011    Heart block 09/17/2010    Chronic renal disease, stage III 09/14/2010    Venous insufficiency 09/14/2010            PAST MEDICAL HISTORY           Past Medical History:   Diagnosis Date    Advanced heart block       [employer] pacemaker Jan 2011    Atrial fibrillation [facility])       discovered Sept 2011    CAD (coronary artery disease), native coronary artery       Diastolic HF, no angina, 2v CABG 3/2/11 ([facility]), LIMA-LAD, VG-OM    Chronic kidney disease      Hypercholesterolemia      Hypertension      S/P CABG x 2 3/2011     Dr Ashly Meneses Symptomatic bradycardia       pacer            PAST SURGICAL HISTORY            Past Surgical History:   Procedure Laterality Date    CARDIAC CATHETERIZATION   2/24/11     Ostial LM 60%, documented by IVUS, mild LAD plaque. Circ and RCA normal. No LVG. EDP 30, wedge 20, RA 14.     ECHO 2D ADULT   2/2011     LVH, EF 60%    ECHO 2D ADULT   9/1/11     LVH, EF 45%, mild NEFTALY            ALLERGIES           Allergies   Allergen Reactions    Iron Anaphylaxis    Bumex [Bumetanide] Rash            FAMILY HISTORY      No family history on file.  negative for cardiac disease        SOCIAL HISTORY       Social History                Social History    Marital status:        Spouse name: N/A    Number of children: N/A    Years of education: N/A             Social History Main Topics    Smoking status: Former Smoker       Quit date: 1/14/1988    Smokeless tobacco: Never Used         Comment: stopped 1988    Alcohol use No    Drug use: None    Sexual activity: Not Asked           Other Topics Concern    None      Social History Narrative               MEDICATIONS           Current Outpatient Prescriptions   Medication Sig    furosemide (LASIX) 40 mg tablet Take 0.5 Tabs by mouth daily. Indications: PULMONARY EDEMA DUE TO CHRONIC HEART FAILURE    ethacrynic acid (EDECRIN) 25 mg tablet Take 50 mg by mouth daily.  diphenhydrAMINE (BENADRYL) 25 mg capsule Take 25 mg by mouth nightly as needed.  warfarin (JANTOVEN) 5 mg tablet TAKE 1 TABLET BY MOUTH DAILY. OR AS DIRECTED    labetalol (NORMODYNE) 200 mg tablet Take 1 Tab by mouth two (2) times a day.  cloNIDine HCl (CATAPRES) 0.2 mg tablet Take 1 Tab by mouth two (2) times a day.  FERROUS SULFATE Take 65 mg by mouth.  levothyroxine (SYNTHROID) 50 mcg tablet Take by mouth Daily (before breakfast).  sodium polystyrene (SPS) 15 gram/60 mL suspension Take 15 g by mouth See Admin Instructions. 3 times weekly    calcitRIOL (ROCALTROL) 0.25 mcg capsule Take 0.25 mcg by mouth daily.  losartan (COZAAR) 25 mg tablet Take 50 mg by mouth daily.  cholecalciferol, vitamin d3, (VITAMIN D) 1,000 unit tablet Take 50,000 Units by mouth daily. 50,000 iu every two weeks     aspirin 81 mg tablet Take by mouth daily.  simvastatin (ZOCOR) 20 mg tablet Take by mouth nightly.      No current facility-administered medications for this visit.          I have reviewed the nurses notes, vitals, problem list, allergy list, medical history, family, social history and medications.        REVIEW OF SYMPTOMS       General: Pt denies excessive weight gain or loss.  Pt is able to conduct ADL's  HEENT: Denies blurred vision, headaches, hearing loss, epistaxis and difficulty swallowing. Respiratory: Denies cough, congestion, shortness of breath, TIMMONS, wheezing or stridor. Cardiovascular: Denies precordial pain, palpitations, edema or PND  Gastrointestinal: Denies poor appetite, indigestion, abdominal pain or blood in stool  Genitourinary: Denies hematuria, dysuria, increased urinary frequency  Musculoskeletal: Denies joint pain or swelling from muscles or joints  Neurologic: Denies tremor, paresthesias, headache, or sensory motor disturbance  Psychiatric: Denies confusion, insomnia, depression  Integumentray: Denies rash, itching or ulcers. Hematologic: Denies easy bruising, bleeding        PHYSICAL EXAMINATION           Vitals:     03/01/17 1503   BP: 130/70   Pulse: 65   SpO2: 91%   Weight: 209 lb (94.8 kg)   Height: 5' 8.9\" (1.75 m)      General: Well developed, in no acute distress. HEENT: No jaundice, oral mucosa moist, no oral ulcers  Neck: Supple, no stiffness, no lymphadenopathy, supple  Heart:  Normal S1/S2 negative S3 or S4. Regular, no murmur, gallop or rub, no jugular venous distention  Respiratory: Clear bilaterally x 4, no wheezing or rales  Abdomen:   Soft, non-tender, bowel sounds are active.   Extremities: No edema, normal cap refill, no cyanosis. Musculoskeletal: No clubbing, no deformities  Neuro: A&Ox3, speech clear, gait stable, cooperative, no focal neurologic deficits  Skin: Skin color is normal. No rashes or lesions.  Non diaphoretic, moist.  Vascular: 2+ pulses symmetric in all extremities        DIAGNOSTIC DATA       EKG: Apacing        LABORATORY DATA             Lab Results   Component Value Date/Time     WBC 14.3 03/08/2011 03:40 AM     Hemoglobin (POC) 10.2 02/07/2011 05:55 PM     HGB 7.7 03/08/2011 03:40 AM     Hematocrit (POC) 30 02/07/2011 05:55 PM     HCT 23.8 03/08/2011 03:40 AM     PLATELET 881 39/26/1715 03:40 AM     MCV 89.5 03/08/2011 03:40 AM            Lab Results   Component Value Date/Time     Sodium 145 09/19/2016 01:03 PM     Potassium 4.3 09/19/2016 01:03 PM     Chloride 99 09/19/2016 01:03 PM     CO2 29 09/19/2016 01:03 PM     Anion gap 9 03/08/2011 03:40 AM     Glucose 84 09/19/2016 01:03 PM     BUN 43 09/19/2016 01:03 PM     Creatinine 2.89 09/19/2016 01:03 PM     BUN/Creatinine ratio 15 09/19/2016 01:03 PM     GFR est AA 22 09/19/2016 01:03 PM     GFR est non-AA 19 09/19/2016 01:03 PM     Calcium 9.3 09/19/2016 01:03 PM     Bilirubin, total 0.5 03/08/2011 03:40 AM     AST (SGOT) 43 03/08/2011 03:40 AM     Alk. phosphatase 64 03/08/2011 03:40 AM     Protein, total 6.4 03/08/2011 03:40 AM     Albumin 2.9 03/08/2011 03:40 AM     Globulin 3.5 03/08/2011 03:40 AM     A-G Ratio 0.8 03/08/2011 03:40 AM     ALT (SGPT) 7 03/08/2011 03:40 AM            ASSESSMENT       1. Atrial fibrillation   2. CAD/CABG  3. Pacemaker   4. Hypertension  5. Chronic kidney disease  6. Hyperlipidemia         PLAN      Plan for SJM PPM generator change. Hold coumadin 1 day prior.         FOLLOW-UP      1 week post procedure     Thank you, Marlo Hudson MD and Dr. Carolee Bray for allowing me to participate in the care of this extraordinarily pleasant male. Please do not hesitate to contact me for further questions/concerns.            Gabriel Escamilla MD  Cardiac Electrophysiology / Cardiology     [facility] [address].  [address] [place] [address], [facility], [address] [place] [address] [facility] [address] [facility]  [phone] / [phone] Fax   [phone] / [phone] Fax        No changes in H/P since 3/1/17. Proceed with generator change.      Gabriel Escamilla MD

## 2017-03-10 NOTE — IP AVS SNAPSHOT
[place] 
 
 
 [address] [facility] 
[phone] Patient: Yu Shea MRN: [id number] 
TSA:1/68/7097 You are allergic to the following Allergen Reactions Iron Anaphylaxis Bumex (Bumetanide) Rash Recent Documentation Height Weight BMI Smoking Status 1.765 m 93.1 kg 29.88 kg/m2 Former Smoker Emergency Contacts Name Discharge Info Relation Home Work [employer] [facility] [employer] [facility] CAREGIVER [3] Spouse [3] [phone] About your hospitalization You were admitted on:  March 10, 2017 You last received care in the:  [facility] PACU You were discharged on:  March 10, 2017 Unit phone number:  [phone] Why you were hospitalized Your primary diagnosis was:  Not on File Providers Seen During Your Hospitalizations Provider Role Specialty Primary office phone Natty Reyes MD Attending Provider Cardiology [phone] Your Primary Care Physician (PCP) Primary Care Physician Office Phone Office Fax Kay Morris [phone] Follow-up Information Follow up With Details Comments Contact Info Natty Reyes MD On 3/20/2017 11:40 am  [address] [place] [address] 
[phone] Johnnie Rice MD   [address] [place] [address] 
[phone] Your Appointments Monday March 20, 2017 11:40 AM EDT HOSPITAL DISCHARGE with Natty Reyes MD  
[facility] ([facility]) [address] [facility] [address] [facility]  
[phone] Monday April 03, 2017  3:20 PM EDT  
COUMADIN CLINIC with LAWANDA MORA  
[facility] ([facility] SCHEDULING) [address] [facility] [address] [facility]  
[phone] Current Discharge Medication List  
  
 START taking these medications Dose & Instructions Dispensing Information Comments Morning Noon Evening Bedtime  
 cephALEXin 500 mg capsule Commonly known as:  Trevon [place] Your next dose is: Today, Tomorrow Other:  _________ Dose:  500 mg Take 1 Cap by mouth three (3) times daily for 5 days. Quantity:  15 Cap Refills:  0 CONTINUE these medications which have NOT CHANGED Dose & Instructions Dispensing Information Comments Morning Noon Evening Bedtime  
 aspirin 81 mg tablet Your next dose is: Today, Tomorrow Other:  _________ Take  by mouth daily. Refills:  0  
     
   
   
   
  
 calcitRIOL 0.25 mcg capsule Commonly known as:  ROCALTROL Your next dose is: Today, Tomorrow Other:  _________ Dose:  0.25 mcg Take 0.25 mcg by mouth daily. Refills:  0  
     
   
   
   
  
 cloNIDine HCl 0.2 mg tablet Commonly known as:  CATAPRES Your next dose is: Today, Tomorrow Other:  _________ Dose:  0.2 mg Take 1 Tab by mouth two (2) times a day. Quantity:  180 Tab Refills:  3 diphenhydrAMINE 25 mg capsule Commonly known as:  BENADRYL Your next dose is: Today, Tomorrow Other:  _________ Dose:  25 mg Take 25 mg by mouth nightly as needed. Refills:  0  
     
   
   
   
  
 ethacrynic acid 25 mg tablet Commonly known as:  EDECRIN Your next dose is: Today, Tomorrow Other:  _________ Dose:  50 mg Take 50 mg by mouth daily. Refills:  0 FERROUS SULFATE Your next dose is: Today, Tomorrow Other:  _________ Dose:  65 mg Take 65 mg by mouth. Refills:  0  
     
   
   
   
  
 furosemide 40 mg tablet Commonly known as:  LASIX Your next dose is: Today, Tomorrow Other:  _________  Dose:  20 mg  
 Take 0.5 Tabs by mouth daily. Indications: PULMONARY EDEMA DUE TO CHRONIC HEART FAILURE Quantity:  90 Tab Refills:  3  
     
   
   
   
  
 labetalol 200 mg tablet Commonly known as:  [facility] Your next dose is: Today, Tomorrow Other:  _________ Dose:  200 mg Take 1 Tab by mouth two (2) times a day. Quantity:  180 Tab Refills:  3  
     
   
   
   
  
 levothyroxine 50 mcg tablet Commonly known as:  SYNTHROID Your next dose is: Today, Tomorrow Other:  _________ Take  by mouth Daily (before breakfast). Refills:  0  
     
   
   
   
  
 losartan 25 mg tablet Commonly known as:  COZAAR Your next dose is: Today, Tomorrow Other:  _________ Dose:  50 mg Take 50 mg by mouth daily. Refills:  0  
     
   
   
   
  
 SPS (WITH SORBITOL) 15 gram/60 mL suspension Generic drug:  sodium polystyrene Your next dose is: Today, Tomorrow Other:  _________ Dose:  15 g Take 15 g by mouth See Admin Instructions. 3 times weekly Refills:  0  
     
   
   
   
  
 VITAMIN D3 1,000 unit tablet Generic drug:  cholecalciferol Your next dose is: Today, Tomorrow Other:  _________ Dose:  98930 Units Take 50,000 Units by mouth daily. 50,000 iu every two weeks Refills:  0  
     
   
   
   
  
 warfarin 5 mg tablet Commonly known as:  Spangle Chamberlain Your next dose is: Today, Tomorrow Other:  _________ TAKE 1 TABLET BY MOUTH DAILY. OR AS DIRECTED Quantity:  90 Tab Refills:  3 ZOCOR 20 mg tablet Generic drug:  simvastatin Your next dose is: Today, Tomorrow Other:  _________ Take  by mouth nightly. Refills:  0 Where to Get Your Medications These medications were sent to [facility]  [address], [address] [address] Phone:  [phone]  
  cephALEXin 500 mg capsule Discharge Instructions Pacemaker Discharge Instructions Please make sure you have received your Temporary Pacemaker identification card with your discharge instructions MEDICATIONS ? Take only the medications prescribed to you at discharge. ? You are prescribed an antibiotic to take for 5 days. Please do not miss doses of this prescription. ACTIVITY ? Return to your normal activity, except as noted below. o Avoid tight clothes or unnecessary pressure over your incision (such as bra straps or seat belts). If it is tender or sensitive to clothing, cover the incision with a soft dressing or pad. 
o Questions about driving are individualized and should be discussed with one of the EP Physicians prior to discharge. SHOWERING  
  
 
? Leave the bandage over your incision for 7 days after the Pacemaker implant. You bandage will be removed in clinic in 7 days. ? It is important to keep the bandaged area clean and dry. You may shower around the site until the bandage is removed in clinic. Thereafter, you may shower after the bandage is removed, washing it gently with soap and water. Do not apply any lotions, powders, or perfumes to the incision line. ? Avoid submerging your incision in water (tub baths, hot tubs, or swimming) for four weeks. ? Underneath the dressing. o If you have white steri-strips over your incision (underneath the gauze dressing), they will curl up at the end and fall off, usually within 10 days. Do not pull them off. 
- OR -  
o You may have a different type of closure for the incision. If Dermabond Adhesive was used to close your incision, you will receive a separate instruction sheet. DISCHARGE PRECAUTIONS ? Record your temperature every day, at the same time, for 3 weeks after your implant.   A temperature of 100.5 F, or higher, can be the first sign of infection. This should be reported to your Doctor immediately. ? You cannot have an MRI. You must be aware that any strong magnet or magnetic field can affect your Pacemaker. In general, be careful of metal detectors, heavy machinery, and any area where arc-welding is performed. Avoid metal detectors such as the ones in security checkpoints at [facility] or [address] [place] [address]. When approaching a security checkpoint show your Pacemaker ID Card to security personnel and ask to be hand searched. ? Always tell your doctor or dentist that you have a Pacemaker. Antibiotics may be prescribed before certain procedures. ? If you use a cell phone, hold it on the opposite side from where your Pacemaker is implanted. ? Your temporary identification will be given to you with these instructions. Keep your Pacemaker card in your wallet or on your person at all times. You should receive your permanent card in 8 weeks. If you do not receive your permanent card, please call the office at [phone]. TAKING YOUR PULSE ? Take your pulse the same time every day, preferably in the morning. ? Sit down and rest for 5 minutes prior to taking your pulse. ? Take your pulse for 1 full minute, use a clock or stop watch with a second hand. ? To feel your pulse, use the first two fingers of one hand; place them on the thumb side of the wrist of the opposite hand. The pulse will be steady, regular and throbbing. ? Call the EP Lab Doctors if your pulse is less than 40 beats per minute. SYMPTOMS THAT NEED TO BE REPORTED IMMEDIATELY ? Temperature more than 100.4 F ? Redness or warmth at the incision site, or pain for longer than the first 5 days after the implant. ? Drainage from the incision site. ? Swelling around the incision site. ? Shortness of breath. ? Rapid heart rate or palpitations. ? Dizziness, lightheadedness, fainting. ? Slow pulse below 40 beats per minute. ? REMEMBER: If you feel something is an emergency or cannot be handled over the phone, call 911 or go to the closest emergency room. Jasmeet Angeles MD 
Cardiac Electrophysiology / Cardiology [address] 
[address], [address] [facility], [address] [place] [address]         [address] [facility] 
[phone] / [phone] Fax       [phone] / [phone] Fax Discharge Orders None Introducing [facility] & HEALTH SERVICES! Tiera Tijerina introduces [employer] patient portal. Now you can access parts of your medical record, email your doctor's office, and request medication refills online. 1. In your internet browser, go to [email or web address] 2. Click on the First Time User? Click Here link in the Sign In box. You will see the New Member Sign Up page. 3. Enter your [employer] Access Code exactly as it appears below. You will not need to use this code after youve completed the sign-up process. If you do not sign up before the expiration date, you must request a new code. · [employer] Access Code: [email or web address] Expires: 5/7/2017  2:36 PM 
 
4. Enter the last four digits of your Social Security Number (xxxx) and Date of Birth (mm/dd/yyyy) as indicated and click Submit. You will be taken to the next sign-up page. 5. Create a Monitor My Medst ID. This will be your [employer] login ID and cannot be changed, so think of one that is secure and easy to remember. 6. Create a [employer] password. You can change your password at any time. 7. Enter your Password Reset Question and Answer. This can be used at a later time if you forget your password. 8. Enter your e-mail address. You will receive e-mail notification when new information is available in [address] 9. Click Sign Up. You can now view and download portions of your medical record. 10. Click the Download Summary menu link to download a portable copy of your medical information. If you have questions, please visit the Frequently Asked Questions section of the [employer] website. Remember, MyChart is NOT to be used for urgent needs. For medical emergencies, dial 911. Now available from your iPhone and Android! General Information Please provide this summary of care documentation to your next provider. Patient Signature:  ____________________________________________________________ Date:  ____________________________________________________________  
  
my [facility] Provider Signature:  ____________________________________________________________ Date:  ____________________________________________________________

## 2017-03-10 NOTE — PROCEDURES
Cardiac Electrophysiology Report      PATIENT INFORMATION      Patient Name: Vidhi Reyes  MRN: [id number]             Study Date: 3/10/2017    YOB: 1932   Age: 80 y.o. Gender: male      Procedure:  Pacemaker Generator Change    Referring Physician:  Ashanti Frias MD and Dr. Georgia Redman     Duty Name   Electrophysiologist Tj Montalvo MD   Monitor Truong Pelletier RN   Circulator Ariel Miller RN; Ernesto Laws RN       PATIENT HISTORY     Vidhi Reyes is a 80 y.o. male established patient with history of AF, CAD/CABG, pacemaker, CHB, HTN, CKD, hyperlipidemia, chronic SOB, LVEF ranging between 40-50% here for follow up of PPM. His PPM is now at [facility] since January. He is currently on Coumadin for CVA risk reduction. He presents for PPM generator change. PROCEDURE     The patient was brought to the Cardiac Electrophysiology laboratory in a post-absorptive, fasting state. Informed consent was obtained. A peripheral IV was in place. Continuous electrocardiographic, blood pressure, O2 saturation and  CO2 monitoring was initiated. Pre-operative antibiotics were administered pre-operatively. Self-adhesive cardioversion patches were positioned on the chest.  Conscious sedation was effectuated according to protocol. The patient was then prepped and draped in the usual sterile fashion. A 50/50 mixture of lidocaine (1%) with epinephrine and bupivicaine (0.5%) was utilized for local anesthesia. An incision was performed over the chronic pulse generator. Sharp and blunt dissection was carried down to the level of the generator. Hemostasis was maintained with electrocautery. The generator and leads were carefully freed from the adhesive scar capsule. The leads appeared to be intact upon visual inspection. The pacemaker generator was disconnected from the leads. A new generator was then connected to the chronic leads.  The pocket was irrigated copiously with antibiotic solution. The generator was then placed into the pocket. The pocket was then closed in three layers using 2-O vicryl, 3-O vicryl and 4-O monocryl absorbable suture material and Dermabond. The skin was closed using a sub-cuticular technique. A bio-occlusive dressing were applied to the skin. The patient remained hemodynamically stable, tolerated the procedure well and was transferred in stable condition. There were no immediate complications encountered during the procedure. LEAD & GENERATOR DATA       Model # Serial #   Explanted Generator [facility] [id number]   Implanted Generator [facility] [id number]   Chronic Atrial Lead [facility] 1488 [id number]   Chronic Ventricular Lead [facility] 2088 [id number]       PACE/SENSE DATA      Sensed Wave (mV) Threshold (V) Impedance (Ohms)   Atrium 1.2 (AF) n/a 390   Ventricle Evoked 1.75 300       FINAL PROGRAMMING     Mode Lower Rate (ppm) Upper Rate (ppm)   DDD 60 120       MEDICATION SUMMARY     Medication Route Unit Total   Gentamycin IV mg 80   Ancef IV grams 2   Fentanyl IV micrograms 50   Versed IV grams 3       CONCLUSIONS     1. Successful pacemaker generator change. 2. Keflex 500 mg po tid x 5 days. 3. Wound check in EP clinic in 7 days. 4. Follow up in EP clinic in 1 month or earlier if necessary. 5. Follow up with  Ashanti Frias MD and Dr. Esther Tidwell as scheduled. Thank you, Ashanti Frias MD and Dr. Esther Tidwell for involving me in the care of this extraordinarily pleasant male.         Tj Montalvo MD  Cardiac Electrophysiology / Cardiology    [facility] 92.  [address] [place] [address], [address] [facility]osevelt[facility]   [address], [address]  [phone] / [phone] Fax [phone] / [phone] Fax

## 2017-03-10 NOTE — DISCHARGE INSTRUCTIONS
Pacemaker  Discharge Instructions    Please make sure you have received your Temporary Pacemaker identification card with your discharge instructions      MEDICATIONS         Take only the medications prescribed to you at discharge.  You are prescribed an antibiotic to take for 5 days. Please do not miss doses of this prescription. ACTIVITY         Return to your normal activity, except as noted below. o Avoid tight clothes or unnecessary pressure over your incision (such as bra straps or seat belts). If it is tender or sensitive to clothing, cover the incision with a soft dressing or pad.  o Questions about driving are individualized and should be discussed with one of the EP Physicians prior to discharge. SHOWERING         Leave the bandage over your incision for 7 days after the Pacemaker implant. You bandage will be removed in clinic in 7 days.  It is important to keep the bandaged area clean and dry. You may shower around the site until the bandage is removed in clinic. Thereafter, you may shower after the bandage is removed, washing it gently with soap and water. Do not apply any lotions, powders, or perfumes to the incision line.  Avoid submerging your incision in water (tub baths, hot tubs, or swimming) for four weeks.  Underneath the dressing. o If you have white steri-strips over your incision (underneath the gauze dressing), they will curl up at the end and fall off, usually within 10 days. Do not pull them off.  - OR -   o You may have a different type of closure for the incision. If Dermabond Adhesive was used to close your incision, you will receive a separate instruction sheet. DISCHARGE PRECAUTIONS         Record your temperature every day, at the same time, for 3 weeks after your implant. A temperature of 100.5 F, or higher, can be the first sign of infection. This should be reported to your Doctor immediately.  You cannot have an MRI.   You must be aware that any strong magnet or magnetic field can affect your Pacemaker. In general, be careful of metal detectors, heavy machinery, and any area where arc-welding is performed. Avoid metal detectors such as the ones in security checkpoints at [facility] or [address] [place] [address]. When approaching a security checkpoint show your Pacemaker ID Card to security personnel and ask to be hand searched.  Always tell your doctor or dentist that you have a Pacemaker. Antibiotics may be prescribed before certain procedures.  If you use a cell phone, hold it on the opposite side from where your Pacemaker is implanted.  Your temporary identification will be given to you with these instructions. Keep your Pacemaker card in your wallet or on your person at all times. You should receive your permanent card in 8 weeks. If you do not receive your permanent card, please call the office at [phone]. TAKING YOUR PULSE         Take your pulse the same time every day, preferably in the morning.  Sit down and rest for 5 minutes prior to taking your pulse.  Take your pulse for 1 full minute, use a clock or stop watch with a second hand.  To feel your pulse, use the first two fingers of one hand; place them on the thumb side of the wrist of the opposite hand. The pulse will be steady, regular and throbbing.  Call the EP Lab Doctors if your pulse is less than 40 beats per minute. SYMPTOMS THAT NEED TO BE REPORTED IMMEDIATELY         Temperature more than 100.4 F     Redness or warmth at the incision site, or pain for longer than the first 5 days after the implant.  Drainage from the incision site.  Swelling around the incision site.  Shortness of breath.  Rapid heart rate or palpitations.  Dizziness, lightheadedness, fainting.  Slow pulse below 40 beats per minute.      REMEMBER: If you feel something is an emergency or cannot be handled over the phone, call [phone] or go to the closest emergency room.           Lee Ann Torres MD  Cardiac Electrophysiology / Cardiology    [address] [facility]  [address] [place] [address], [address] [facility], [address]  [place] 57         Cynthia Fox  [phone] / [phone] Fax       [phone] / [phone] Fax

## 2017-03-20 ENCOUNTER — OFFICE VISIT (OUTPATIENT)
Dept: CARDIOLOGY CLINIC | Age: 82
End: 2017-03-20

## 2017-03-20 VITALS
SYSTOLIC BLOOD PRESSURE: 158 MMHG | HEART RATE: 70 BPM | OXYGEN SATURATION: 98 % | WEIGHT: 212 LBS | RESPIRATION RATE: 18 BRPM | HEIGHT: 70 IN | BODY MASS INDEX: 30.35 KG/M2 | DIASTOLIC BLOOD PRESSURE: 78 MMHG

## 2017-03-20 DIAGNOSIS — Z95.0 PACEMAKER: Primary | ICD-10-CM

## 2017-03-20 NOTE — MR AVS SNAPSHOT
Visit Information Date & Time Provider Department Dept. Phone Encounter #  
 3/20/2017 11:40 AM Shahram Pineda MD [facility] [phone] [id number] Your Appointments 4/3/2017  3:20 PM  
COUMADIN CLINIC with COUMADINLAWANDA  
[facility] (STEPHANIE SCHEDULING) Appt Note: f/u sov$0 crf 03/01/17  
 [address] [place] [address] [facility] [address]  
[phone]  
  
   
 [address] [address]  
  
    
 4/26/2017  1:45 PM  
PACEMAKER with PACEMAKERMEAVE  
[facility] (STEPHANIE SCHEDULING) Appt Note: sll  
 700 [facility] [address] [facility]  
[phone]  
  
   
 [address] [address]  
  
    
 4/26/2017  2:00 PM  
ESTABLISHED PATIENT with Shahram Pineda MD  
[facility] ([address] [place] [address]) Appt Note: 5 wk hosp fu and device check sll; sll  
 [address] [place] [address] [facility]  
[phone]  
  
   
 [address] [address]  
  
    
 8/14/2017  3:00 PM  
ECHO CARDIOGRAMS 2D with ECHO, STFRANCIS  
[facility] ([address] [place] [address]) Appt Note: 6 mo fup echo at 3 at 4 dr Juan Morris 700 [facility] [address] [facility] 99 [address]  
[phone]  
  
   
 [address] [address]  
  
    
 8/14/2017  4:00 PM  
ESTABLISHED PATIENT with Charline Saavedra MD  
[facility] ([address] [place] [address] Road) Appt Note: 6 mo fup echo at 3 at 4 dr Juan Morris 700 [facility] [place] [address] [facility]  
[address] [facility] [place] [address] [address] Upcoming Health Maintenance Date Due DTaP/Tdap/Td series (1 - Tdap) 2/23/1953 ZOSTER VACCINE AGE 60> 2/23/1992 GLAUCOMA SCREENING Q2Y 2/23/1997 MEDICARE YEARLY EXAM 2/23/1997 Pneumococcal 65+ High/Highest Risk (2 of 2 - PPSV23) 1/1/2011 INFLUENZA AGE 9 TO ADULT 8/1/2016 Allergies as of 3/20/2017  Review Complete On: 3/20/2017 By: Shantel Painter NP Severity Noted Reaction Type Reactions Iron High 03/07/2013    Anaphylaxis Bumex [Bumetanide]  09/14/2010   Systemic Rash Current Immunizations  Reviewed on 3/5/2011 Name Date Influenza Vaccine Whole 11/1/2010 Pneumococcal Vaccine (Unspecified Type) 1/1/2006 Not reviewed this visit Vitals BP Pulse Resp Height(growth percentile) Weight(growth percentile) SpO2  
 158/78 (BP 1 Location: Right arm, BP Patient Position: Supine) 70 18 5' 9.5\" (1.765 m) 212 lb (96.2 kg) 98% BMI Smoking Status 30.86 kg/m2 Former Smoker Vitals History BMI and BSA Data Body Mass Index Body Surface Area  
 30.86 kg/m 2 2.17 m 2 Preferred Pharmacy Pharmacy Name Phone [facility]/PHARMACY #[id number] [place] [address] [phone] Your Updated Medication List  
  
   
This list is accurate as of: 3/20/17 12:12 PM.  Always use your most recent med list.  
  
  
  
  
 aspirin 81 mg tablet Take  by mouth daily. calcitRIOL 0.25 mcg capsule Commonly known as:  ROCALTROL Take 0.25 mcg by mouth daily. cloNIDine HCl 0.2 mg tablet Commonly known as:  CATAPRES Take 1 Tab by mouth two (2) times a day. diphenhydrAMINE 25 mg capsule Commonly known as:  BENADRYL Take 25 mg by mouth nightly as needed. ethacrynic acid 25 mg tablet Commonly known as:  EDECRIN Take 50 mg by mouth daily. FERROUS SULFATE Take 65 mg by mouth. furosemide 40 mg tablet Commonly known as:  LASIX Take 0.5 Tabs by mouth daily. Indications: PULMONARY EDEMA DUE TO CHRONIC HEART FAILURE  
  
 labetalol 200 mg tablet Commonly known as:  Misha Chance  
 Take 1 Tab by mouth two (2) times a day. levothyroxine 50 mcg tablet Commonly known as:  SYNTHROID Take  by mouth Daily (before breakfast). losartan 25 mg tablet Commonly known as:  COZAAR Take 50 mg by mouth daily. SPS (WITH SORBITOL) 15 gram/60 mL suspension Generic drug:  sodium polystyrene Take 15 g by mouth See Admin Instructions. 3 times weekly VITAMIN D3 1,000 unit tablet Generic drug:  cholecalciferol Take 50,000 Units by mouth daily. 50,000 iu every two weeks  
  
 warfarin 5 mg tablet Commonly known as:  JANTOVEN  
TAKE 1 TABLET BY MOUTH DAILY. OR AS DIRECTED  
  
 ZOCOR 20 mg tablet Generic drug:  simvastatin Take  by mouth nightly. Introducing [facility]! Hossein Fleming introduces [employer] patient portal. Now you can access parts of your medical record, email your doctor's office, and request medication refills online. 1. In your internet browser, go to [email or web address] 2. Click on the First Time User? Click Here link in the Sign In box. You will see the New Member Sign Up page. 3. Enter your Delig[employer] Access Code exactly as it appears below. You will not need to use this code after youve completed the sign-up process. If you do not sign up before the expiration date, you must request a new code. · Del[employer] Access Code: [email or web address] Expires: 5/7/2017  3:36 PM 
 
4. Enter the last four digits of your Social Security Number (xxxx) and Date of Birth (mm/dd/yyyy) as indicated and click Submit. You will be taken to the next sign-up page. 5. Create a Deligic ID. This will be your Deligic login ID and cannot be changed, so think of one that is secure and easy to remember. 6. Create a Del[employer] password. You can change your password at any time. 7. Enter your Password Reset Question and Answer. This can be used at a later time if you forget your password. 8. Enter your e-mail address. You will receive e-mail notification when new information is available in [address] 9. Click Sign Up. You can now view and download portions of your medical record. 10. Click the Download Summary menu link to download a portable copy of your medical information. If you have questions, please visit the Frequently Asked Questions section of the Mindman[employer] website. Remember, Mindmancer is NOT to be used for urgent needs. For medical emergencies, dial 911. Now available from your iPhone and Android! Please provide this summary of care documentation to your next provider. Your primary care clinician is listed as Kristen Lacey. If you have any questions after today's visit, please call [phone].

## 2017-03-20 NOTE — PROGRESS NOTES
Visit Vitals    /78 (BP 1 Location: Right arm, BP Patient Position: Supine)    Pulse 70    Resp 18    Ht 5' 9.5\" (1.765 m)    Wt 212 lb (96.2 kg)    SpO2 98%    BMI 30.86 kg/m2

## 2017-04-03 ENCOUNTER — CLINICAL SUPPORT (OUTPATIENT)
Dept: CARDIOLOGY CLINIC | Age: 82
End: 2017-04-03

## 2017-04-03 DIAGNOSIS — I48.0 PAROXYSMAL ATRIAL FIBRILLATION (HCC): ICD-10-CM

## 2017-04-03 DIAGNOSIS — Z79.01 LONG TERM (CURRENT) USE OF ANTICOAGULANTS: Primary | ICD-10-CM

## 2017-04-03 LAB
INR BLD: 2.8
PT POC: 34 SEC
VALID INTERNAL CONTROL?: YES

## 2017-04-24 RX ORDER — LABETALOL 200 MG/1
TABLET, FILM COATED ORAL
Qty: 180 TAB | Refills: 3 | Status: SHIPPED | OUTPATIENT
Start: 2017-04-24 | End: 2018-03-25 | Stop reason: SDUPTHER

## 2017-04-24 NOTE — TELEPHONE ENCOUNTER
Pharmacy verified     90 day supply with refills     Mr. Peter Spain did indicate that he's just about out of this medication.      Thank you, You Murphy

## 2017-04-26 ENCOUNTER — OFFICE VISIT (OUTPATIENT)
Dept: CARDIOLOGY CLINIC | Age: 82
End: 2017-04-26

## 2017-04-26 ENCOUNTER — CLINICAL SUPPORT (OUTPATIENT)
Dept: CARDIOLOGY CLINIC | Age: 82
End: 2017-04-26

## 2017-04-26 VITALS
DIASTOLIC BLOOD PRESSURE: 82 MMHG | WEIGHT: 207.6 LBS | BODY MASS INDEX: 29.72 KG/M2 | RESPIRATION RATE: 18 BRPM | SYSTOLIC BLOOD PRESSURE: 146 MMHG | OXYGEN SATURATION: 96 % | HEIGHT: 70 IN | HEART RATE: 64 BPM

## 2017-04-26 DIAGNOSIS — Z95.0 CARDIAC PACEMAKER IN SITU: Primary | ICD-10-CM

## 2017-04-26 DIAGNOSIS — Z79.01 LONG TERM (CURRENT) USE OF ANTICOAGULANTS: Primary | ICD-10-CM

## 2017-04-26 DIAGNOSIS — Z95.0 PACEMAKER: Primary | ICD-10-CM

## 2017-04-26 DIAGNOSIS — I48.0 PAROXYSMAL ATRIAL FIBRILLATION (HCC): ICD-10-CM

## 2017-04-26 LAB
INR BLD: 2.8
INR, EXTERNAL: 2.8 (ref 2–3)
PT POC: 33.2 SEC
VALID INTERNAL CONTROL?: YES

## 2017-04-26 NOTE — PROGRESS NOTES
HISTORY OF PRESENTING ILLNESS      Felicia Wood is a 80 y.o. male with history of AF, CAD/CABG, pacemaker, CHB, HTN, CKD, hyperlipidemia, chronic SOB, LVEF ranging between 40-50% here for follow up of PPM. His PPM was at [facility] and he thus underwent PPM generator change and presents now for follow up. He is feeling well with improvement in his energy level. Device interrogation reveals normal device functioning. A stitch was removed from the lateral aspect of the incision and replaced by Steri-Strips. 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 ([facility] 04/04/2011    Chronic diastolic heart failure ([facility] 04/04/2011    Coronary artery disease 03/01/2011    Heart block 09/17/2010    Chronic renal disease, stage III 09/14/2010    Venous insufficiency 09/14/2010           PAST MEDICAL HISTORY     Past Medical History:   Diagnosis Date    Advanced heart block     [address] Robert pacemaker Jan 2011    Atrial fibrillation [facility])     discovered Sept 2011    CAD (coronary artery disease), native coronary artery     Diastolic HF, no angina, 2v CABG 3/2/11 ([facility]), LIMA-LAD, VG-OM    Chronic kidney disease     Hypercholesterolemia     Hypertension     S/P CABG x 2 3/2011    Dr Anuradha Messer Symptomatic bradycardia     pacer           PAST SURGICAL HISTORY     Past Surgical History:   Procedure Laterality Date    CARDIAC CATHETERIZATION  2/24/11    Ostial LM 60%, documented by IVUS, mild LAD plaque. Circ and RCA normal. No LVG.  EDP 30, wedge 20, RA 14.     ECHO 2D ADULT  2/2011    LVH, EF 60%    ECHO 2D ADULT  9/1/11    LVH, EF 45%, mild NEFTALY          ALLERGIES     Allergies   Allergen Reactions    Iron Anaphylaxis    Bumex [Bumetanide] Rash          FAMILY HISTORY     History reviewed. No pertinent family history. negative for cardiac disease       SOCIAL HISTORY     Social History     Social History    Marital status:      Spouse name: N/A    Number of children: N/A    Years of education: N/A     Social History Main Topics    Smoking status: Former Smoker     Quit date: 1/14/1988    Smokeless tobacco: Never Used      Comment: stopped 1988    Alcohol use No    Drug use: None    Sexual activity: Not Asked     Other Topics Concern    None     Social History Narrative         MEDICATIONS     Current Outpatient Prescriptions   Medication Sig    labetalol (NORMODYNE) 200 mg tablet TAKE 1 TABLET TWICE A DAY    furosemide (LASIX) 40 mg tablet Take 0.5 Tabs by mouth daily. Indications: PULMONARY EDEMA DUE TO CHRONIC HEART FAILURE    ethacrynic acid (EDECRIN) 25 mg tablet Take 50 mg by mouth daily.  diphenhydrAMINE (BENADRYL) 25 mg capsule Take 25 mg by mouth nightly as needed.  warfarin (JANTOVEN) 5 mg tablet TAKE 1 TABLET BY MOUTH DAILY. OR AS DIRECTED    cloNIDine HCl (CATAPRES) 0.2 mg tablet Take 1 Tab by mouth two (2) times a day.  FERROUS SULFATE Take 65 mg by mouth.  levothyroxine (SYNTHROID) 50 mcg tablet Take  by mouth Daily (before breakfast).  sodium polystyrene (SPS) 15 gram/60 mL suspension Take 15 g by mouth See Admin Instructions. 3 times weekly    calcitRIOL (ROCALTROL) 0.25 mcg capsule Take 0.25 mcg by mouth daily.  losartan (COZAAR) 25 mg tablet Take 50 mg by mouth daily.  cholecalciferol, vitamin d3, (VITAMIN D) 1,000 unit tablet Take 50,000 Units by mouth daily. 50,000 iu every two weeks     aspirin 81 mg tablet Take  by mouth daily.  simvastatin (ZOCOR) 20 mg tablet Take  by mouth nightly. No current facility-administered medications for this visit.         I have reviewed the nurses notes, vitals, problem list, allergy list, medical history, family, social history and medications. REVIEW OF SYMPTOMS      General: Pt denies excessive weight gain or loss. Pt is able to conduct ADL's  HEENT: Denies blurred vision, headaches, hearing loss, epistaxis and difficulty swallowing. Respiratory: Denies cough, congestion, shortness of breath, TIMMONS, wheezing or stridor. Cardiovascular: Denies precordial pain, palpitations, edema or PND  Gastrointestinal: Denies poor appetite, indigestion, abdominal pain or blood in stool  Genitourinary: Denies hematuria, dysuria, increased urinary frequency  Musculoskeletal: Denies joint pain or swelling from muscles or joints  Neurologic: Denies tremor, paresthesias, headache, or sensory motor disturbance  Psychiatric: Denies confusion, insomnia, depression  Integumentray: Denies rash, itching or ulcers. Hematologic: Denies easy bruising, bleeding       PHYSICAL EXAMINATION      Vitals:    04/26/17 1309   BP: 146/82   Pulse: 64   Resp: 18   SpO2: 96%   Weight: 207 lb 9.6 oz (94.2 kg)   Height: 5' 9.5\" (1.765 m)     General: Well developed, in no acute distress. HEENT: No jaundice, oral mucosa moist, no oral ulcers  Neck: Supple, no stiffness, no lymphadenopathy, supple  Heart:  Normal S1/S2 negative S3 or S4. Regular, no murmur, gallop or rub, no jugular venous distention  Respiratory: Clear bilaterally x 4, no wheezing or rales  Abdomen:   Soft, non-tender, bowel sounds are active.   Extremities:  No edema, normal cap refill, no cyanosis. Musculoskeletal: No clubbing, no deformities  Neuro: A&Ox3, speech clear, gait stable, cooperative, no focal neurologic deficits  Skin: Skin color is normal. No rashes or lesions.  Non diaphoretic, moist.  Vascular: 2+ pulses symmetric in all extremities       DIAGNOSTIC DATA      EKG:        LABORATORY DATA      Lab Results   Component Value Date/Time    WBC 9.0 03/07/2017 02:13 PM    Hemoglobin (POC) 10.2 02/07/2011 05:55 PM    HGB 10.4 03/07/2017 02:13 PM    Hematocrit (POC) 30 02/07/2011 05:55 PM    HCT 32.0 03/07/2017 02:13 PM    PLATELET 867 34/33/0772 02:13 PM    MCV 88 03/07/2017 02:13 PM      Lab Results   Component Value Date/Time    Sodium 144 03/07/2017 02:13 PM    Potassium 4.1 03/07/2017 02:13 PM    Chloride 101 03/07/2017 02:13 PM    CO2 24 03/07/2017 02:13 PM    Anion gap 9 03/08/2011 03:40 AM    Glucose 117 03/07/2017 02:13 PM    BUN 39 03/07/2017 02:13 PM    Creatinine 2.45 03/07/2017 02:13 PM    BUN/Creatinine ratio 16 03/07/2017 02:13 PM    GFR est AA 27 03/07/2017 02:13 PM    GFR est non-AA 23 03/07/2017 02:13 PM    Calcium 9.0 03/07/2017 02:13 PM    Bilirubin, total 0.5 03/08/2011 03:40 AM    AST (SGOT) 43 03/08/2011 03:40 AM    Alk. phosphatase 64 03/08/2011 03:40 AM    Protein, total 6.4 03/08/2011 03:40 AM    Albumin 2.9 03/08/2011 03:40 AM    Globulin 3.5 03/08/2011 03:40 AM    A-G Ratio 0.8 03/08/2011 03:40 AM    ALT (SGPT) 7 03/08/2011 03:40 AM           ASSESSMENT      1. Atrial fibrillation   2. CAD/CABG  3. Pacemaker    A. SJM  4. Hypertension  5. Chronic kidney disease  6. Hyperlipidemia         PLAN     Continue following up in device clinic. FOLLOW-UP     1 year    Thank you, Jane Myers MD and Dr. Arsenio Preston for allowing me to participate in the care of this extraordinarily pleasant male. Please do not hesitate to contact me for further questions/concerns.          Gwen Rodgers MD  Cardiac Electrophysiology / Cardiology    [facility] 92.  [address] [facility], [facility], [address], [facility] [address]  [phone] / [phone] Fax   [phone] / [phone] Fax

## 2017-06-07 ENCOUNTER — CLINICAL SUPPORT (OUTPATIENT)
Dept: CARDIOLOGY CLINIC | Age: 82
End: 2017-06-07

## 2017-06-07 DIAGNOSIS — Z79.01 LONG TERM (CURRENT) USE OF ANTICOAGULANTS: Primary | ICD-10-CM

## 2017-06-07 DIAGNOSIS — I48.0 PAROXYSMAL ATRIAL FIBRILLATION (HCC): ICD-10-CM

## 2017-06-07 LAB
INR BLD: 2.6
INR, EXTERNAL: 31.6 (ref 2–3)
PT POC: 31.6 SECONDS
VALID INTERNAL CONTROL?: YES

## 2017-07-03 ENCOUNTER — CLINICAL SUPPORT (OUTPATIENT)
Dept: CARDIOLOGY CLINIC | Age: 82
End: 2017-07-03

## 2017-07-03 DIAGNOSIS — Z79.01 LONG TERM (CURRENT) USE OF ANTICOAGULANTS: Primary | ICD-10-CM

## 2017-07-03 DIAGNOSIS — I48.0 PAROXYSMAL ATRIAL FIBRILLATION (HCC): ICD-10-CM

## 2017-07-03 LAB
INR BLD: 2.4
INR, EXTERNAL: 2.4 (ref 2–3)
PT POC: 28.8 SECONDS
VALID INTERNAL CONTROL?: YES

## 2017-07-16 DIAGNOSIS — E78.5 DYSLIPIDEMIA: ICD-10-CM

## 2017-07-16 DIAGNOSIS — I25.10 CORONARY ARTERY DISEASE INVOLVING NATIVE CORONARY ARTERY OF NATIVE HEART WITHOUT ANGINA PECTORIS: Chronic | ICD-10-CM

## 2017-07-16 DIAGNOSIS — N18.30 CHRONIC RENAL DISEASE, STAGE III (HCC): Chronic | ICD-10-CM

## 2017-07-19 RX ORDER — WARFARIN SODIUM 5 MG/1
TABLET ORAL
Qty: 90 TAB | Refills: 2 | Status: SHIPPED | OUTPATIENT
Start: 2017-07-19 | End: 2018-03-25 | Stop reason: SDUPTHER

## 2017-07-31 ENCOUNTER — CLINICAL SUPPORT (OUTPATIENT)
Dept: CARDIOLOGY CLINIC | Age: 82
End: 2017-07-31

## 2017-07-31 DIAGNOSIS — Z79.01 LONG TERM (CURRENT) USE OF ANTICOAGULANTS: Primary | ICD-10-CM

## 2017-07-31 DIAGNOSIS — I48.0 PAROXYSMAL ATRIAL FIBRILLATION (HCC): ICD-10-CM

## 2017-07-31 LAB
INR BLD: 2.8
INR, EXTERNAL: 2.8 (ref 2–3)
INR, EXTERNAL: 2.8 (ref 2–3)
PT POC: 33.6 SECONDS
VALID INTERNAL CONTROL?: YES

## 2017-08-08 ENCOUNTER — OFFICE VISIT (OUTPATIENT)
Dept: CARDIOLOGY CLINIC | Age: 82
End: 2017-08-08

## 2017-08-08 DIAGNOSIS — Z95.0 CARDIAC PACEMAKER IN SITU: Primary | ICD-10-CM

## 2017-08-14 ENCOUNTER — CLINICAL SUPPORT (OUTPATIENT)
Dept: CARDIOLOGY CLINIC | Age: 82
End: 2017-08-14

## 2017-08-14 ENCOUNTER — OFFICE VISIT (OUTPATIENT)
Dept: CARDIOLOGY CLINIC | Age: 82
End: 2017-08-14

## 2017-08-14 VITALS
DIASTOLIC BLOOD PRESSURE: 70 MMHG | RESPIRATION RATE: 18 BRPM | HEART RATE: 76 BPM | OXYGEN SATURATION: 100 % | HEIGHT: 70 IN | BODY MASS INDEX: 28.92 KG/M2 | SYSTOLIC BLOOD PRESSURE: 142 MMHG | WEIGHT: 202 LBS

## 2017-08-14 DIAGNOSIS — I45.9 HEART BLOCK: ICD-10-CM

## 2017-08-14 DIAGNOSIS — I25.10 CORONARY ARTERY DISEASE INVOLVING NATIVE CORONARY ARTERY OF NATIVE HEART WITHOUT ANGINA PECTORIS: Primary | ICD-10-CM

## 2017-08-14 DIAGNOSIS — I42.8 OTHER CARDIOMYOPATHY (HCC): ICD-10-CM

## 2017-08-14 DIAGNOSIS — Z95.0 PACEMAKER: ICD-10-CM

## 2017-08-14 DIAGNOSIS — I87.2 VENOUS INSUFFICIENCY: ICD-10-CM

## 2017-08-14 DIAGNOSIS — I48.91 ATRIAL FIBRILLATION, UNSPECIFIED TYPE (HCC): ICD-10-CM

## 2017-08-14 DIAGNOSIS — E78.5 DYSLIPIDEMIA: ICD-10-CM

## 2017-08-14 DIAGNOSIS — I50.32 CHRONIC DIASTOLIC HEART FAILURE (HCC): ICD-10-CM

## 2017-08-14 DIAGNOSIS — I11.0 BENIGN HYPERTENSIVE HEART DISEASE WITH HEART FAILURE (HCC): ICD-10-CM

## 2017-08-14 DIAGNOSIS — I48.20 CHRONIC ATRIAL FIBRILLATION (HCC): Primary | ICD-10-CM

## 2017-08-14 NOTE — PROGRESS NOTES
[place] [address], [address]  [address]  [place], [address] [facility]    Office [phone]  Fax [phone]  Cell [phone]    HISTORY OF PRESENT ILLNESS  Terrence Deng is a 80 y.o. male. Last seen 6 months ago. Past Medical History:   Diagnosis Date    Advanced heart block     [facility] Robert pacemaker Jan 2011    Atrial fibrillation [facility])     discovered Sept 2011    CAD (coronary artery disease), native coronary artery     Diastolic HF, no angina, 2v CABG 3/2/11 [facility]), LIMA-LAD, VG-OM    Chronic kidney disease     Hypercholesterolemia     Hypertension     S/P CABG x 2 3/2011    Dr Simone Del Cid    Symptomatic bradycardia     pacer     Cardiac testing  1/4/11- SJM pacemaker per Dr. Starla Lagos    2v CABG 3/2/11 [facility]), LIMA-LAD, VG-OM   Advanced heart block - [facility] pacemaker Jan 2011   Cardiac catheterization 2/24/11- Ostial LM 60%, documented by IVUS, mild LAD plaque. Circ and RCA normal. No LVG. EDP 30, wedge 20, RA 14.   Echo 2d adult 2/2011 - LVH, EF 60%   Echo 2d adult 9/1/11- LVH, EF 45%, mild NEFTALY   Lexiscan cardiolite 9/10/13 - normal perfusion, EF 47%  Echo 2/5/14 - EF 68%, grade 3 diastolic dysfunction, ventricular septal paradoxical motion, mild to mod LAE, mild MR, mod PA HTN. Echo 7/11/16 - EF 40 % to 45 %. Moderate hypokinesis of the apical wall(s). Moderate LAE. Dilated right atrium. Mild MAC with mild to moderate MR. AoV sclerosis without stenosis. Moderate TR with PASP 51mmHg. Moderate Pulm HTN. Interval decline in LVEF noted. PA systolic pressure has increased when compared to 05-Feb-2014. Lexiscan Cardiolite 7/11/16 - Normal perfusion. EF 51%. 3/10/17- SJM pacemaker generator change 3/10/17 per Dr. Kailash Blevins  Echo 8/14/17- LVEF 42%, LVH, moderate LAE      HPI   Mr. Sheehan reports TIMMONS only with climbing stairs or walking fast. BPs at home have been fine. He denies any bleeding issues on Coumadin.  He gets regular pacemaker checks, generator was changed in March. Patient denies any exertional chest pain, palpitations, syncope, orthopnea, edema or paroxysmal nocturnal dyspnea. Current Outpatient Prescriptions   Medication Sig Dispense Refill    warfarin (COUMADIN) 5 mg tablet TAKE 1 TABLET DAILY OR AS DIRECTED 90 Tab 2    labetalol (NORMODYNE) 200 mg tablet TAKE 1 TABLET TWICE A  Tab 3    furosemide (LASIX) 40 mg tablet Take 0.5 Tabs by mouth daily. Indications: PULMONARY EDEMA DUE TO CHRONIC HEART FAILURE 90 Tab 3    ethacrynic acid (EDECRIN) 25 mg tablet Take 50 mg by mouth daily.  cloNIDine HCl (CATAPRES) 0.2 mg tablet Take 1 Tab by mouth two (2) times a day. 180 Tab 3    FERROUS SULFATE Take 65 mg by mouth.  levothyroxine (SYNTHROID) 50 mcg tablet Take  by mouth Daily (before breakfast).  sodium polystyrene (SPS) 15 gram/60 mL suspension Take 15 g by mouth See Admin Instructions. 3 times weekly      calcitRIOL (ROCALTROL) 0.25 mcg capsule Take 0.25 mcg by mouth daily.  losartan (COZAAR) 25 mg tablet Take 50 mg by mouth daily.  cholecalciferol, vitamin d3, (VITAMIN D) 1,000 unit tablet Take 50,000 Units by mouth daily. 50,000 iu every two weeks       aspirin 81 mg tablet Take  by mouth daily.  simvastatin (ZOCOR) 20 mg tablet Take  by mouth nightly. Allergies   Allergen Reactions    Iron Anaphylaxis    Bumex [Bumetanide] Rash     Retired     Review of Systems  Constitutional: Negative for fever, chills, malaise/fatigue and diaphoresis. Respiratory: Negative for cough, hemoptysis, sputum production, and wheezing. Positive for TIMMONS. Cardiovascular: Negative for chest pain, palpitations, orthopnea, claudication, leg swelling and PND. Gastrointestinal: Negative for heartburn, nausea, vomiting, blood in stool and melena. Genitourinary: Negative for dysuria and flank pain. Musculoskeletal: Negative for joint pain and back pain. Skin: Negative for rash.   Neurological: Negative for focal weakness, seizures, loss of consciousness, weakness and headaches. Endo/Heme/Allergies: Does not bruise/bleed easily. Psychiatric/Behavioral: Negative for memory loss. The patient does not have insomnia. Visit Vitals    /70 (BP 1 Location: Left arm, BP Patient Position: Sitting)    Pulse 76    Resp 18    Ht 5' 9.5\" (1.765 m)    Wt 202 lb (91.6 kg)    SpO2 100%    BMI 29.4 kg/m2     Wt Readings from Last 3 Encounters:   08/14/17 202 lb (91.6 kg)   04/26/17 207 lb 9.6 oz (94.2 kg)   03/20/17 212 lb (96.2 kg)     Physical Exam   Vitals reviewed. Constitutional: He is oriented to person, place, and time. He appears well-developed. Head: Normocephalic. Neck: Neck supple. No JVD present. No tracheal deviation present. Cardiovascular: Normal rate, regular rhythm, normal heart sounds and intact distal pulses. Exam reveals no gallop and no friction rub. No murmur heard. Sternotomy scar well healed. Pulmonary/Chest: left basal crackles. Abdominal: Bowel sounds are normal. No tenderness. He has no rebound. Musculoskeletal: He exhibits no edema  Neurological: He is alert and oriented to person, place, and time. Skin: Skin is warm and dry. Psychiatric: He has a normal mood and affect. Cardiographics  Echo March 2013 - EF 50%, prominent septal dyssynchrony  Echo 2/5/14 - EF 40%, prominent septal dyssynchrony, PA sys 50  CXR 1/27/14 - bilateral effusions, left > right  Labs 1/28/13 - , Cr 2.03, K 4.2  EKG 8/9/16 - V paced with likely underlying AF  Echo 8/14/17- LVEF 42%, LVH, moderate LAE    ASSESSMENT and PLAN  Encounter Diagnoses   Name Primary?    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 Tila Cooley has CAD s/p CABG 1697, complicated by AF/conduction disease s/p ppm. Stress nuclear study 1 year ago was normal. Echo today demonstrates persistent LV dysfunction (LVEF 40%). I suspect this is due to chronic RV pacing. He continues to have chronic class 2 HF sxs on current diuretic regimen. We discussed dose adjustment PRN weight gain. AF is rate controlled. Continue regular pace maker checks. Follow-up Disposition:  Return in about 6 months (around 2/14/2018).     Written by Cade Garcia, dictated by Maritza Gamez MD.  Maritza Gamez MD

## 2017-08-14 NOTE — MR AVS SNAPSHOT
Visit Information Date & Time Provider Department Dept. Phone Encounter #  
 8/14/2017  4:00 PM Mel Santos MD [facility] Maria Teresa Mclaughlin [phone] [id number] Follow-up Instructions Return in about 6 months (around 2/14/2018). Your Appointments 8/29/2017  2:20 PM  
COUMADIN CLINIC with COUMLAWANDA KERN  
[facility] (STEPHANIE SCHEDULING) Appt Note: f/u sov$0 crf 07/31/17  
 [address] [place] [address] [facility] [address]  
[phone]  
  
   
 [address] [address]  
  
    
 5/23/2018  3:15 PM  
PACEMAKER with PACEMAKERMAEVE  
[facility] ([address]) Appt Note: stj threshold/stf/rosa  new Merlin lidia  
 [address] [place] [address] [address]  
  
    
 5/23/2018  3:20 PM  
ESTABLISHED PATIENT with Lo Campa MD  
[facility] ([address]) Appt Note: annual  pacer [address] [facility] [address]  
[phone]  
  
   
 [address] [place] [address]  
  
    
  
 11/14/2017  9:30 AM  
REMOTE OFFICE VISIT with Derick Altamirano [facility] (STEPHANIE SCHEDULING) Appt Note: Merlin pacer/stf  
 [address] [address] [facility]  
[phone]  
  
   
 [address] [place] [address] [place]  
  
    
 2/20/2018  9:00 AM  
REMOTE OFFICE VISIT with Derick Altamirano [facility] (STEPHANIE SCHEDULING) Appt Note: Merlin pacer/stf  
 [address] [place] [address] [address] [facility]  
[phone] Upcoming Health Maintenance Date Due DTaP/Tdap/Td series (1 - Tdap) 2/23/1953 ZOSTER VACCINE AGE 60> 12/23/1991 GLAUCOMA SCREENING Q2Y 2/23/1997 MEDICARE YEARLY EXAM 2/23/1997 Pneumococcal 65+ Low/Medium Risk (2 of 2 - PPSV23) 1/1/2011 INFLUENZA AGE 9 TO ADULT 8/1/2017 Allergies as of 8/14/2017  Review Complete On: 8/14/2017 By: Marimar Wang LPN Severity Noted Reaction Type Reactions Iron High 03/07/2013    Anaphylaxis Bumex [Bumetanide]  09/14/2010   Systemic Rash Current Immunizations  Reviewed on 3/5/2011 Name Date Influenza Vaccine Whole 11/1/2010 ZZZ-RETIRED (DO NOT USE) Pneumococcal Vaccine (Unspecified Type) 1/1/2006 Not reviewed this visit You Were Diagnosed With   
  
 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 
ICD-9-CM: 428.32 Heart block     ICD-10-CM: I45.9 ICD-9-CM: 426.9 Elective replacement indicated for cardiac pacemaker battery at end of lifespan     ICD-10-CM: Z45.010 
ICD-9-CM: V53.31 Pacemaker     ICD-10-CM: Z95.0 ICD-9-CM: V45.01 Venous insufficiency     ICD-10-CM: I87.2 ICD-9-CM: 459.81 Vitals BP Pulse Resp Height(growth percentile) Weight(growth percentile) SpO2  
 142/70 (BP 1 Location: Left arm, BP Patient Position: Sitting) 76 18 5' 9.5\" (1.765 m) 202 lb (91.6 kg) 100% BMI Smoking Status 29.4 kg/m2 Former Smoker BMI and BSA Data Body Mass Index Body Surface Area  
 29.4 kg/m 2 2.12 m 2 Preferred Pharmacy Pharmacy Name Phone [address] Jan[address] Nagel [facility] [phone] Your Updated Medication List  
  
   
 This list is accurate as of: 8/14/17  4:04 PM.  Always use your most recent med list.  
  
  
  
  
 aspirin 81 mg tablet Take  by mouth daily. calcitRIOL 0.25 mcg capsule Commonly known as:  ROCALTROL Take 0.25 mcg by mouth daily. cloNIDine HCl 0.2 mg tablet Commonly known as:  CATAPRES Take 1 Tab by mouth two (2) times a day.  
  
 ethacrynic acid 25 mg tablet Commonly known as:  EDECRIN Take 50 mg by mouth daily. FERROUS SULFATE Take 65 mg by mouth. furosemide 40 mg tablet Commonly known as:  LASIX Take 0.5 Tabs by mouth daily. Indications: PULMONARY EDEMA DUE TO CHRONIC HEART FAILURE  
  
 labetalol 200 mg tablet Commonly known as:  NORMODYNE  
TAKE 1 TABLET TWICE A DAY  
  
 levothyroxine 50 mcg tablet Commonly known as:  SYNTHROID Take  by mouth Daily (before breakfast). losartan 25 mg tablet Commonly known as:  COZAAR Take 50 mg by mouth daily. SPS (WITH SORBITOL) 15 gram/60 mL suspension Generic drug:  sodium polystyrene Take 15 g by mouth See Admin Instructions. 3 times weekly VITAMIN D3 1,000 unit tablet Generic drug:  cholecalciferol Take 50,000 Units by mouth daily. 50,000 iu every two weeks  
  
 warfarin 5 mg tablet Commonly known as:  COUMADIN  
TAKE 1 TABLET DAILY OR AS DIRECTED  
  
 ZOCOR 20 mg tablet Generic drug:  simvastatin Take  by mouth nightly. Follow-up Instructions Return in about 6 months (around 2/14/2018). Introducing [facility] & HEALTH SERVICES! Odalys Jones introduces [employer] ([employer]) patient portal. Now you can access parts of your medical record, email your doctor's office, and request medication refills online. 1. In your internet browser, go to [email or web address] 2. Click on the First Time User? Click Here link in the Sign In box. You will see the New Member Sign Up page. 3. Enter your [employer] ([employer]) Access Code exactly as it appears below.  You will not need to use this code after youve completed the sign-up process. If you do not sign up before the expiration date, you must request a new code. · [employer] Access Code: [email or web address] Expires: 9/5/2017  2:20 PM 
 
4. Enter the last four digits of your Social Security Number (xxxx) and Date of Birth (mm/dd/yyyy) as indicated and click Submit. You will be taken to the next sign-up page. 5. Create a [employer] ID. This will be your [employer] login ID and cannot be changed, so think of one that is secure and easy to remember. 6. Create a [employer] password. You can change your password at any time. 7. Enter your Password Reset Question and Answer. This can be used at a later time if you forget your password. 8. Enter your e-mail address. You will receive e-mail notification when new information is available in [address] 9. Click Sign Up. You can now view and download portions of your medical record. 10. Click the Download Summary menu link to download a portable copy of your medical information. If you have questions, please visit the Frequently Asked Questions section of the [employer] website. Remember, [employer] is NOT to be used for urgent needs. For medical emergencies, dial 911. Now available from your iPhone and Android! Please provide this summary of care documentation to your next provider. Your primary care clinician is listed as Yonathan Preciado. If you have any questions after today's visit, please call [phone].

## 2017-08-20 PROBLEM — I42.9 CARDIOMYOPATHY (HCC): Status: ACTIVE | Noted: 2017-08-20

## 2017-08-20 PROBLEM — I48.20 CHRONIC ATRIAL FIBRILLATION (HCC): Status: ACTIVE | Noted: 2017-08-20

## 2017-08-29 ENCOUNTER — CLINICAL SUPPORT (OUTPATIENT)
Dept: CARDIOLOGY CLINIC | Age: 82
End: 2017-08-29

## 2017-08-29 DIAGNOSIS — I48.20 CHRONIC ATRIAL FIBRILLATION (HCC): ICD-10-CM

## 2017-08-29 DIAGNOSIS — I25.5 ISCHEMIC CARDIOMYOPATHY: ICD-10-CM

## 2017-08-29 DIAGNOSIS — Z79.01 LONG TERM (CURRENT) USE OF ANTICOAGULANTS: Primary | ICD-10-CM

## 2017-08-29 LAB
INR BLD: 3
INR, EXTERNAL: 3 (ref 2–3)
INR, EXTERNAL: 3 (ref 2–3)
PT POC: 40.1 SECONDS
VALID INTERNAL CONTROL?: YES

## 2017-09-25 ENCOUNTER — CLINICAL SUPPORT (OUTPATIENT)
Dept: CARDIOLOGY CLINIC | Age: 82
End: 2017-09-25

## 2017-09-25 DIAGNOSIS — I25.5 ISCHEMIC CARDIOMYOPATHY: ICD-10-CM

## 2017-09-25 DIAGNOSIS — I48.20 CHRONIC ATRIAL FIBRILLATION (HCC): ICD-10-CM

## 2017-09-25 DIAGNOSIS — Z95.0 PACEMAKER: ICD-10-CM

## 2017-09-25 DIAGNOSIS — Z79.01 LONG TERM (CURRENT) USE OF ANTICOAGULANTS: Primary | ICD-10-CM

## 2017-09-25 LAB
INR BLD: 3.3
INR, EXTERNAL: 3.3 (ref 2–3)
PT POC: 39.1 SECONDS
VALID INTERNAL CONTROL?: YES

## 2017-10-23 ENCOUNTER — CLINICAL SUPPORT (OUTPATIENT)
Dept: CARDIOLOGY CLINIC | Age: 82
End: 2017-10-23

## 2017-10-23 DIAGNOSIS — I48.20 CHRONIC ATRIAL FIBRILLATION (HCC): ICD-10-CM

## 2017-10-23 DIAGNOSIS — I25.5 ISCHEMIC CARDIOMYOPATHY: ICD-10-CM

## 2017-10-23 DIAGNOSIS — Z79.01 LONG-TERM (CURRENT) USE OF ANTICOAGULANTS: Primary | ICD-10-CM

## 2017-10-23 DIAGNOSIS — Z95.0 PACEMAKER: ICD-10-CM

## 2017-10-23 LAB
INR BLD: 3
INR, EXTERNAL: 3 (ref 2–3)
PT POC: 39.1 SECONDS
VALID INTERNAL CONTROL?: YES

## 2017-11-14 ENCOUNTER — OFFICE VISIT (OUTPATIENT)
Dept: CARDIOLOGY CLINIC | Age: 82
End: 2017-11-14

## 2017-11-14 DIAGNOSIS — Z95.0 CARDIAC PACEMAKER IN SITU: Primary | ICD-10-CM

## 2017-11-20 ENCOUNTER — CLINICAL SUPPORT (OUTPATIENT)
Dept: CARDIOLOGY CLINIC | Age: 82
End: 2017-11-20

## 2017-11-20 DIAGNOSIS — Z79.01 LONG-TERM (CURRENT) USE OF ANTICOAGULANTS: Primary | ICD-10-CM

## 2017-11-20 DIAGNOSIS — Z95.0 PACEMAKER: ICD-10-CM

## 2017-11-20 DIAGNOSIS — I48.20 CHRONIC ATRIAL FIBRILLATION (HCC): ICD-10-CM

## 2017-11-20 DIAGNOSIS — I25.5 ISCHEMIC CARDIOMYOPATHY: ICD-10-CM

## 2017-11-20 LAB
INR BLD: 3.1
INR, EXTERNAL: 3.1 (ref 2–3)
PT POC: 36.7 SECONDS
VALID INTERNAL CONTROL?: YES

## 2017-12-04 DIAGNOSIS — I50.32 CHRONIC DIASTOLIC HEART FAILURE (HCC): ICD-10-CM

## 2017-12-06 RX ORDER — FUROSEMIDE 40 MG/1
TABLET ORAL
Qty: 90 TAB | Refills: 2 | Status: SHIPPED | OUTPATIENT
Start: 2017-12-06 | End: 2018-01-01 | Stop reason: DRUGHIGH

## 2017-12-18 ENCOUNTER — CLINICAL SUPPORT (OUTPATIENT)
Dept: CARDIOLOGY CLINIC | Age: 82
End: 2017-12-18

## 2017-12-18 DIAGNOSIS — I25.5 ISCHEMIC CARDIOMYOPATHY: ICD-10-CM

## 2017-12-18 DIAGNOSIS — I48.20 CHRONIC ATRIAL FIBRILLATION (HCC): ICD-10-CM

## 2017-12-18 DIAGNOSIS — Z79.01 LONG-TERM (CURRENT) USE OF ANTICOAGULANTS: Primary | ICD-10-CM

## 2017-12-18 DIAGNOSIS — Z95.0 PACEMAKER: ICD-10-CM

## 2017-12-18 LAB
INR BLD: 2.6
PT POC: 30.8 SECONDS
VALID INTERNAL CONTROL?: YES

## 2018-01-01 ENCOUNTER — CLINICAL SUPPORT (OUTPATIENT)
Dept: CARDIOLOGY CLINIC | Age: 83
End: 2018-01-01

## 2018-01-01 ENCOUNTER — OFFICE VISIT (OUTPATIENT)
Dept: CARDIOLOGY CLINIC | Age: 83
End: 2018-01-01

## 2018-01-01 ENCOUNTER — TELEPHONE ANTICOAG (OUTPATIENT)
Dept: CARDIOLOGY CLINIC | Age: 83
End: 2018-01-01

## 2018-01-01 VITALS
SYSTOLIC BLOOD PRESSURE: 142 MMHG | BODY MASS INDEX: 29.02 KG/M2 | HEIGHT: 70 IN | HEART RATE: 72 BPM | RESPIRATION RATE: 18 BRPM | OXYGEN SATURATION: 99 % | WEIGHT: 202.7 LBS | DIASTOLIC BLOOD PRESSURE: 70 MMHG

## 2018-01-01 DIAGNOSIS — I25.5 ISCHEMIC CARDIOMYOPATHY: ICD-10-CM

## 2018-01-01 DIAGNOSIS — I25.10 CORONARY ARTERY DISEASE INVOLVING NATIVE CORONARY ARTERY OF NATIVE HEART WITHOUT ANGINA PECTORIS: Primary | ICD-10-CM

## 2018-01-01 DIAGNOSIS — Z95.0 CARDIAC PACEMAKER IN SITU: Primary | ICD-10-CM

## 2018-01-01 DIAGNOSIS — I48.20 CHRONIC ATRIAL FIBRILLATION (HCC): ICD-10-CM

## 2018-01-01 DIAGNOSIS — I50.32 CHRONIC DIASTOLIC HEART FAILURE (HCC): ICD-10-CM

## 2018-01-01 DIAGNOSIS — Z95.0 PACEMAKER: ICD-10-CM

## 2018-01-01 DIAGNOSIS — D72.18 EOSINOPHILIC MYOCARDITIS: ICD-10-CM

## 2018-01-01 DIAGNOSIS — N18.30 CHRONIC RENAL DISEASE, STAGE III (HCC): Chronic | ICD-10-CM

## 2018-01-01 DIAGNOSIS — Z79.01 LONG TERM (CURRENT) USE OF ANTICOAGULANTS: Primary | ICD-10-CM

## 2018-01-01 DIAGNOSIS — E78.5 DYSLIPIDEMIA: ICD-10-CM

## 2018-01-01 DIAGNOSIS — Z95.0 PACEMAKER: Primary | ICD-10-CM

## 2018-01-01 DIAGNOSIS — I40.1 EOSINOPHILIC MYOCARDITIS: ICD-10-CM

## 2018-01-01 DIAGNOSIS — I48.91 ATRIAL FIBRILLATION, UNSPECIFIED TYPE (HCC): ICD-10-CM

## 2018-01-01 DIAGNOSIS — I87.2 VENOUS INSUFFICIENCY: ICD-10-CM

## 2018-01-01 DIAGNOSIS — I25.10 CORONARY ARTERY DISEASE INVOLVING NATIVE CORONARY ARTERY OF NATIVE HEART WITHOUT ANGINA PECTORIS: Chronic | ICD-10-CM

## 2018-01-01 LAB
INR BLD: 2.5
INR BLD: 3
INR BLD: 3.1
INR BLD: 3.1
INR BLD: 3.2
INR, EXTERNAL: 2.5 (ref 2–3)
INR, EXTERNAL: 3 (ref 2–3)
INR, EXTERNAL: 3.1 (ref 2–3)
INR, EXTERNAL: 3.1 (ref 2–3)
INR, EXTERNAL: 3.2 (ref 2–3)
INR, EXTERNAL: 3.6 (ref 2–3)
PT POC: 29.6 SECONDS
PT POC: 37.3 SECONDS
PT POC: 37.7 SECONDS
PT POC: 38.4 SECONDS
PT POC: 38.9 SECONDS
PT POC: 40.7 SECONDS
PT POC: 42 SECONDS
PT POC: 42 SECONDS
VALID INTERNAL CONTROL?: YES

## 2018-01-01 RX ORDER — FUROSEMIDE 40 MG/1
40 TABLET ORAL DAILY
COMMUNITY

## 2018-01-01 RX ORDER — ERGOCALCIFEROL 1.25 MG/1
50000 CAPSULE ORAL EVERY 2 WEEKS
COMMUNITY

## 2018-01-15 ENCOUNTER — CLINICAL SUPPORT (OUTPATIENT)
Dept: CARDIOLOGY CLINIC | Age: 83
End: 2018-01-15

## 2018-01-15 DIAGNOSIS — I25.5 ISCHEMIC CARDIOMYOPATHY: ICD-10-CM

## 2018-01-15 DIAGNOSIS — I48.20 CHRONIC ATRIAL FIBRILLATION (HCC): ICD-10-CM

## 2018-01-15 DIAGNOSIS — Z79.01 LONG-TERM (CURRENT) USE OF ANTICOAGULANTS: Primary | ICD-10-CM

## 2018-01-15 DIAGNOSIS — Z95.0 PACEMAKER: ICD-10-CM

## 2018-01-15 LAB
INR BLD: 2.6
INR, EXTERNAL: 2.6 (ref 2–3)
PT POC: 31.5 SECONDS
VALID INTERNAL CONTROL?: YES

## 2018-02-13 ENCOUNTER — CLINICAL SUPPORT (OUTPATIENT)
Dept: CARDIOLOGY CLINIC | Age: 83
End: 2018-02-13

## 2018-02-13 ENCOUNTER — OFFICE VISIT (OUTPATIENT)
Dept: CARDIOLOGY CLINIC | Age: 83
End: 2018-02-13

## 2018-02-13 VITALS
DIASTOLIC BLOOD PRESSURE: 64 MMHG | SYSTOLIC BLOOD PRESSURE: 124 MMHG | BODY MASS INDEX: 29.56 KG/M2 | OXYGEN SATURATION: 99 % | HEIGHT: 70 IN | RESPIRATION RATE: 16 BRPM | HEART RATE: 70 BPM | WEIGHT: 206.5 LBS

## 2018-02-13 DIAGNOSIS — I25.10 CORONARY ARTERY DISEASE INVOLVING NATIVE CORONARY ARTERY OF NATIVE HEART WITHOUT ANGINA PECTORIS: Primary | Chronic | ICD-10-CM

## 2018-02-13 DIAGNOSIS — I45.9 HEART BLOCK: ICD-10-CM

## 2018-02-13 DIAGNOSIS — N18.30 CHRONIC RENAL DISEASE, STAGE III (HCC): Chronic | ICD-10-CM

## 2018-02-13 DIAGNOSIS — I48.20 CHRONIC ATRIAL FIBRILLATION (HCC): ICD-10-CM

## 2018-02-13 DIAGNOSIS — Z95.0 PACEMAKER: ICD-10-CM

## 2018-02-13 DIAGNOSIS — I11.0 BENIGN HYPERTENSIVE HEART DISEASE WITH HEART FAILURE (HCC): ICD-10-CM

## 2018-02-13 DIAGNOSIS — E78.5 DYSLIPIDEMIA: ICD-10-CM

## 2018-02-13 DIAGNOSIS — I50.32 CHRONIC DIASTOLIC HEART FAILURE (HCC): ICD-10-CM

## 2018-02-13 DIAGNOSIS — Z79.01 LONG-TERM (CURRENT) USE OF ANTICOAGULANTS: Primary | ICD-10-CM

## 2018-02-13 DIAGNOSIS — I25.5 ISCHEMIC CARDIOMYOPATHY: ICD-10-CM

## 2018-02-13 DIAGNOSIS — I87.2 VENOUS INSUFFICIENCY: ICD-10-CM

## 2018-02-13 LAB
INR BLD: 3
INR, EXTERNAL: 3 (ref 2–3)
PT POC: 35.6 SECONDS
VALID INTERNAL CONTROL?: YES

## 2018-02-13 NOTE — MR AVS SNAPSHOT
[address]  [place] [address] [address] [facility] 
[phone] Patient: Austen Pollock MRN: [id number] OIY:[birth year] Visit Information Date & Time Provider Department Dept. Phone Encounter #  
 2/13/2018  4:20 PM Dominga Clemens MD [facility] Sadia Brown [phone] [id number] Follow-up Instructions Return in about 6 months (around 8/13/2018). Your Appointments 3/12/2018  3:20 PM  
COUMADIN CLINIC with LAWANDA MORA  
[facility] ([facility]PHANIE SCHEDULING) Appt Note: f/u sov$0 crf 02/13/18  
 [address] [place] [address] [place] [address]  
[phone]  
  
   
 [address] [place] [address]  
  
    
 5/23/2018  3:15 PM  
PACEMAKER with PACEMAKERMAEVE  
[facility] ([facility]) Appt Note: stj threshold/stf/rosa  new Merlin lidia  
 [address] [facility] [place] [address] [facility]  
[address] [place] [address]  
  
    
 5/23/2018  3:20 PM  
ESTABLISHED PATIENT with Aimee Gregorio MD  
[facility] ([facility]) Appt Note: annual  pacer [address] [facility] [address] [facility]  
[phone]  
  
   
 [address] [facility] [address] [address] [address]  
  
    
  
 2/20/2018  9:00 AM  
REMOTE OFFICE VISIT with Henri Diane [facility] ([facility]PHANIE SCHEDULING) Appt Note: Merlin pacer/stf  
 [address] [facility] [place] [address] [facility]  
[address] [place] [address] [address] Upcoming Health Maintenance Date Due DTaP/Tdap/Td series (1 - Tdap) 2/23/1953 ZOSTER VACCINE AGE 60> 12/23/1991 GLAUCOMA SCREENING Q2Y 2/23/1997 MEDICARE YEARLY EXAM 2/23/1997 Pneumococcal 65+ Low/Medium Risk (2 of 2 - PPSV23) 1/1/2011 Influenza Age 5 to Adult 8/1/2017 Allergies as of 2/13/2018  Review Complete On: 2/13/2018 By: Florin Boyd LPN Severity Noted Reaction Type Reactions Iron High 03/07/2013    Anaphylaxis Bumex [Bumetanide]  09/14/2010   Systemic Rash Current Immunizations  Reviewed on 3/5/2011 Name Date Influenza Vaccine Whole 11/1/2010 ZZZ-RETIRED (DO NOT USE) Pneumococcal Vaccine (Unspecified Type) 1/1/2006 Not reviewed this visit You Were Diagnosed With   
  
 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 
ICD-9-CM: 428.32 Dyslipidemia     ICD-10-CM: E78.5 ICD-9-CM: 272.4 Ischemic cardiomyopathy     ICD-10-CM: I25.5 ICD-9-CM: 414.8 Chronic atrial fibrillation (HCC)     ICD-10-CM: S81.7 ICD-9-CM: 427.31 Chronic renal disease, stage III     ICD-10-CM: N18.3 ICD-9-CM: 518. 3 Venous insufficiency     ICD-10-CM: I87.2 ICD-9-CM: 459.81 Pacemaker     ICD-10-CM: Z95.0 ICD-9-CM: V45.01 Vitals BP Pulse Resp Height(growth percentile) Weight(growth percentile) SpO2  
 124/64 (BP 1 Location: Left arm, BP Patient Position: Sitting) 70 16 5' 9.5\" (1.765 m) 206 lb 8 oz (93.7 kg) 99% BMI Smoking Status 30.06 kg/m2 Former Smoker Vitals History BMI and BSA Data Body Mass Index Body Surface Area 30.06 kg/m 2 2.14 m 2 Preferred Pharmacy Pharmacy Name Phone [id number] Thao Reyes, [address] [place] [phone] Your Updated Medication List  
  
   
 This list is accurate as of: 2/13/18  4:21 PM.  Always use your most recent med list.  
  
  
  
  
 aspirin 81 mg tablet Take  by mouth daily. calcitRIOL 0.25 mcg capsule Commonly known as:  ROCALTROL Take 0.25 mcg by mouth daily. cloNIDine HCl 0.2 mg tablet Commonly known as:  CATAPRES Take 1 Tab by mouth two (2) times a day.  
  
 ethacrynic acid 25 mg tablet Commonly known as:  EDECRIN Take 50 mg by mouth daily. FERROUS SULFATE Take 65 mg by mouth. furosemide 40 mg tablet Commonly known as:  LASIX TAKE 1 TABLET BY MOUTH EVERY DAY  
  
 labetalol 200 mg tablet Commonly known as:  NORMODYNE  
TAKE 1 TABLET TWICE A DAY  
  
 levothyroxine 50 mcg tablet Commonly known as:  SYNTHROID Take  by mouth Daily (before breakfast). losartan 25 mg tablet Commonly known as:  COZAAR Take 50 mg by mouth daily. SPS (WITH SORBITOL) 15 gram/60 mL suspension Generic drug:  sodium polystyrene Take 15 g by mouth See Admin Instructions. 3 times weekly VITAMIN D3 1,000 unit tablet Generic drug:  cholecalciferol Take 50,000 Units by mouth daily. 50,000 iu every two weeks  
  
 warfarin 5 mg tablet Commonly known as:  COUMADIN  
TAKE 1 TABLET DAILY OR AS DIRECTED  
  
 ZOCOR 20 mg tablet Generic drug:  simvastatin Take  by mouth nightly. Follow-up Instructions Return in about 6 months (around 8/13/2018). Patient Instructions Talk to Dr. Quoc Tran about Veltassa to lower your potassium. Introducing [facility]! Blanche Veras introduces [employer] patient portal. Now you can access parts of your medical record, email your doctor's office, and request medication refills online. 1. In your internet browser, go to [email or web address] 2. Click on the First Time User? Click Here link in the Sign In box. You will see the New Member Sign Up page. 3. Enter your [employer] Access Code exactly as it appears below. You will not need to use this code after youve completed the sign-up process. If you do not sign up before the expiration date, you must request a new code. · [employer] Access Code: [email or web address] Expires: 4/15/2018  2:46 PM 
 
4. Enter the last four digits of your Social Security Number (xxxx) and Date of Birth (mm/dd/yyyy) as indicated and click Submit. You will be taken to the next sign-up page. 5. Create a [employer] ID. This will be your [employer] login ID and cannot be changed, so think of one that is secure and easy to remember. 6. Create a [employer] password. You can change your password at any time. 7. Enter your Password Reset Question and Answer. This can be used at a later time if you forget your password. 8. Enter your e-mail address. You will receive e-mail notification when new information is available in [address] 9. Click Sign Up. You can now view and download portions of your medical record. 10. Click the Download Summary menu link to download a portable copy of your medical information. If you have questions, please visit the Frequently Asked Questions section of the [employer] website. Remember, [employer] is NOT to be used for urgent needs. For medical emergencies, dial 911. Now available from your iPhone and Android! Please provide this summary of care documentation to your next provider. Your primary care clinician is listed as Krystle Manjarrez. If you have any questions after today's visit, please call [phone].

## 2018-02-13 NOTE — PROGRESS NOTES
Patient is here for 6 month f/u on AFIB. Visit Vitals    /64 (BP 1 Location: Left arm, BP Patient Position: Sitting)    Pulse 70    Resp 16    Ht 5' 9.5\" (1.765 m)    Wt 206 lb 8 oz (93.7 kg)    SpO2 99%    BMI 30.06 kg/m2     1. Have you been to the ER, urgent care clinic since your last visit? Hospitalized since your last visit?no    2. Have you seen or consulted any other health care providers outside of the [address] [place] [address] since your last visit? Include any pap smears or colon screening.  no

## 2018-02-13 NOTE — PROGRESS NOTES
Farooq Hinkle, [address]  [address]  [place], [address] [facility]    Office [phone]  Fax [phone]  Cell [phone]    HISTORY OF PRESENT ILLNESS  Kate Christensen is a 80 y.o. male. Last seen 6 months ago. Past Medical History:   Diagnosis Date    Advanced heart block     [place] Robert pacemaker Jan 2011    Atrial fibrillation [facility])     discovered Sept 2011    CAD (coronary artery disease), native coronary artery     Diastolic HF, no angina, 2v CABG 3/2/11 [facility]), LIMA-LAD, VG-OM    Chronic kidney disease     Hypercholesterolemia     Hypertension     S/P CABG x 2 3/2011    Dr Jeet Woody    Symptomatic bradycardia     pacer     Cardiac testing  1/4/11- SJM pacemaker per Dr. Tj Moulton    2v CABG 3/2/11 [facility]), LIMA-LAD, VG-OM   Advanced heart block - [place] pacemaker Jan 2011   Cardiac catheterization 2/24/11- Ostial LM 60%, documented by IVUS, mild LAD plaque. Circ and RCA normal. No LVG. EDP 30, wedge 20, RA 14.   Echo 2d adult 2/2011 - LVH, EF 60%   Echo 2d adult 9/1/11- LVH, EF 45%, mild NEFTALY   Lexiscan cardiolite 9/10/13 - normal perfusion, EF 47%  Echo 2/5/14 - EF 66%, grade 3 diastolic dysfunction, ventricular septal paradoxical motion, mild to mod LAE, mild MR, mod PA HTN. Echo 7/11/16 - EF 40 % to 45 %. Moderate hypokinesis of the apical wall(s). Moderate LAE. Dilated right atrium. Mild MAC with mild to moderate MR. AoV sclerosis without stenosis. Moderate TR with PASP 51mmHg. Moderate Pulm HTN. Interval decline in LVEF noted. PA systolic pressure has increased when compared to 05-Feb-2014. Lexiscan Cardiolite 7/11/16 - Normal perfusion. EF 51%. 3/10/17- SJM pacemaker generator change 3/10/17 per Dr. Froilan Du  Echo 8/14/17- LVEF 42%, LVH, moderate LAE      HPI  Mr. Saloni Kebede feels well overall with no interval issues. He notes he has lost some weight.  Patient denies any exertional chest pain, dyspnea, palpitations, syncope, orthopnea, edema or paroxysmal nocturnal dyspnea. He gets routine remote PPM checks. No bleeding issues on Coumadin. He gets routine blood work with Manuel Castillo and Ralf. Current Outpatient Prescriptions   Medication Sig Dispense Refill    furosemide (LASIX) 40 mg tablet TAKE 1 TABLET BY MOUTH EVERY DAY 90 Tab 2    warfarin (COUMADIN) 5 mg tablet TAKE 1 TABLET DAILY OR AS DIRECTED 90 Tab 2    labetalol (NORMODYNE) 200 mg tablet TAKE 1 TABLET TWICE A  Tab 3    ethacrynic acid (EDECRIN) 25 mg tablet Take 50 mg by mouth daily.  cloNIDine HCl (CATAPRES) 0.2 mg tablet Take 1 Tab by mouth two (2) times a day. 180 Tab 3    FERROUS SULFATE Take 65 mg by mouth.  levothyroxine (SYNTHROID) 50 mcg tablet Take  by mouth Daily (before breakfast).  sodium polystyrene (SPS) 15 gram/60 mL suspension Take 15 g by mouth See Admin Instructions. 3 times weekly      calcitRIOL (ROCALTROL) 0.25 mcg capsule Take 0.25 mcg by mouth daily.  losartan (COZAAR) 25 mg tablet Take 50 mg by mouth daily.  cholecalciferol, vitamin d3, (VITAMIN D) 1,000 unit tablet Take 50,000 Units by mouth daily. 50,000 iu every two weeks       aspirin 81 mg tablet Take  by mouth daily.  simvastatin (ZOCOR) 20 mg tablet Take  by mouth nightly. Allergies   Allergen Reactions    Iron Anaphylaxis    Bumex [Bumetanide] Rash     Retired     Review of Systems  Constitutional: Negative for fever, chills, malaise/fatigue and diaphoresis. Respiratory: Negative for cough, hemoptysis, sputum production, and wheezing. Positive for TIMMONS. Cardiovascular: Negative for chest pain, palpitations, orthopnea, claudication, leg swelling and PND. Gastrointestinal: Negative for heartburn, nausea, vomiting, blood in stool and melena. Genitourinary: Negative for dysuria and flank pain. Musculoskeletal: Negative for joint pain and back pain. Skin: Negative for rash.   Neurological: Negative for focal weakness, seizures, loss of consciousness, weakness and headaches. Endo/Heme/Allergies: Does not bruise/bleed easily. Psychiatric/Behavioral: Negative for memory loss. The patient does not have insomnia. Visit Vitals    /64 (BP 1 Location: Left arm, BP Patient Position: Sitting)    Pulse 70    Resp 16    Ht 5' 9.5\" (1.765 m)    Wt 206 lb 8 oz (93.7 kg)    SpO2 99%    BMI 30.06 kg/m2     Wt Readings from Last 3 Encounters:   02/13/18 206 lb 8 oz (93.7 kg)   08/14/17 202 lb (91.6 kg)   04/26/17 207 lb 9.6 oz (94.2 kg)     Physical Exam   Vitals reviewed. Constitutional: He is oriented to person, place, and time. He appears well-developed. Head: Normocephalic. Neck: Neck supple. No JVD present. No tracheal deviation present. Cardiovascular: Normal rate, regular rhythm, normal heart sounds and intact distal pulses. Exam reveals no gallop and no friction rub. No murmur heard. Sternotomy scar well healed. Pulmonary/Chest: left basal crackles. Abdominal: Bowel sounds are normal. No tenderness. He has no rebound. Musculoskeletal: He exhibits no edema  Neurological: He is alert and oriented to person, place, and time. Skin: Skin is warm and dry. Psychiatric: He has a normal mood and affect. Cardiographics  Echo March 2013 - EF 50%, prominent septal dyssynchrony  Echo 2/5/14 - EF 40%, prominent septal dyssynchrony, PA sys 50  CXR 1/27/14 - bilateral effusions, left > right  Labs 1/28/13 - , Cr 2.03, K 4.2  EKG 8/9/16 - V paced with likely underlying AF  Echo 8/14/17- LVEF 42%, LVH, moderate LAE    ASSESSMENT and PLAN  Encounter Diagnoses   Name Primary?    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 Nelida Fowler has CAD s/p CABG 6424, complicated by AF/conduction disease s/p ppm. Stress nuclear study July 2016 was normal. Echo 6 months ago demonstrated stable, mild-moderate LV dysfunction, likely due to chronic RV pacing. He continues to have chronic class 2 HF sxs on current diuretic regimen. We discussed diuretic dose adjustment PRN weight gain. Will reevaluate LV functioning with echo in 6 months. AF is rate controlled. Continue regular pacemaker checks. He has chronic HTN controlled on multiple medications, complicated by CKD. Follow-up Disposition:  Return in about 6 months (around 8/13/2018).   With echo    Written by Rodolfo Evangelista, dictated by Rom Roberts MD.  Rom Roberts MD

## 2018-02-20 ENCOUNTER — OFFICE VISIT (OUTPATIENT)
Dept: CARDIOLOGY CLINIC | Age: 83
End: 2018-02-20

## 2018-02-20 DIAGNOSIS — Z95.0 CARDIAC PACEMAKER IN SITU: Primary | ICD-10-CM

## 2018-03-12 ENCOUNTER — CLINICAL SUPPORT (OUTPATIENT)
Dept: CARDIOLOGY CLINIC | Age: 83
End: 2018-03-12

## 2018-03-12 DIAGNOSIS — I48.20 CHRONIC ATRIAL FIBRILLATION (HCC): ICD-10-CM

## 2018-03-12 DIAGNOSIS — I25.5 ISCHEMIC CARDIOMYOPATHY: ICD-10-CM

## 2018-03-12 DIAGNOSIS — Z95.0 PACEMAKER: ICD-10-CM

## 2018-03-12 DIAGNOSIS — Z79.01 LONG-TERM (CURRENT) USE OF ANTICOAGULANTS: Primary | ICD-10-CM

## 2018-03-12 LAB
INR BLD: 2.8
INR, EXTERNAL: 2.8 (ref 2–3)
PT POC: 33.9 SECONDS
VALID INTERNAL CONTROL?: YES

## 2018-03-25 DIAGNOSIS — I25.10 CORONARY ARTERY DISEASE INVOLVING NATIVE CORONARY ARTERY OF NATIVE HEART WITHOUT ANGINA PECTORIS: Chronic | ICD-10-CM

## 2018-03-25 DIAGNOSIS — E78.5 DYSLIPIDEMIA: ICD-10-CM

## 2018-03-25 DIAGNOSIS — N18.30 CHRONIC RENAL DISEASE, STAGE III (HCC): Chronic | ICD-10-CM

## 2018-03-26 RX ORDER — LABETALOL 200 MG/1
TABLET, FILM COATED ORAL
Qty: 180 TAB | Refills: 3 | Status: SHIPPED | OUTPATIENT
Start: 2018-03-26 | End: 2019-01-01 | Stop reason: SDUPTHER

## 2018-03-26 RX ORDER — WARFARIN SODIUM 5 MG/1
TABLET ORAL
Qty: 90 TAB | Refills: 2 | Status: SHIPPED | OUTPATIENT
Start: 2018-03-26 | End: 2018-01-01 | Stop reason: SDUPTHER

## 2018-04-09 ENCOUNTER — CLINICAL SUPPORT (OUTPATIENT)
Dept: CARDIOLOGY CLINIC | Age: 83
End: 2018-04-09

## 2018-04-09 DIAGNOSIS — Z95.0 PACEMAKER: ICD-10-CM

## 2018-04-09 DIAGNOSIS — Z79.01 LONG TERM (CURRENT) USE OF ANTICOAGULANTS: Primary | ICD-10-CM

## 2018-04-09 DIAGNOSIS — I25.5 ISCHEMIC CARDIOMYOPATHY: ICD-10-CM

## 2018-04-09 DIAGNOSIS — I48.20 CHRONIC ATRIAL FIBRILLATION (HCC): ICD-10-CM

## 2018-04-09 LAB
INR BLD: 2.7
INR, EXTERNAL: 2.7 (ref 2–3)
PT POC: 31.8 SECONDS
VALID INTERNAL CONTROL?: YES

## 2018-05-07 ENCOUNTER — CLINICAL SUPPORT (OUTPATIENT)
Dept: CARDIOLOGY CLINIC | Age: 83
End: 2018-05-07

## 2018-05-07 DIAGNOSIS — Z95.0 PACEMAKER: ICD-10-CM

## 2018-05-07 DIAGNOSIS — I48.20 CHRONIC ATRIAL FIBRILLATION (HCC): ICD-10-CM

## 2018-05-07 DIAGNOSIS — Z79.01 LONG TERM (CURRENT) USE OF ANTICOAGULANTS: Primary | ICD-10-CM

## 2018-05-07 DIAGNOSIS — I25.5 ISCHEMIC CARDIOMYOPATHY: ICD-10-CM

## 2018-05-07 LAB
INR BLD: 2.9
INR, EXTERNAL: 2.9 (ref 2–3)
PT POC: 35.3 SECONDS
VALID INTERNAL CONTROL?: YES

## 2018-05-23 ENCOUNTER — OFFICE VISIT (OUTPATIENT)
Dept: CARDIOLOGY CLINIC | Age: 83
End: 2018-05-23

## 2018-05-23 ENCOUNTER — CLINICAL SUPPORT (OUTPATIENT)
Dept: CARDIOLOGY CLINIC | Age: 83
End: 2018-05-23

## 2018-05-23 VITALS
SYSTOLIC BLOOD PRESSURE: 114 MMHG | WEIGHT: 204 LBS | HEIGHT: 70 IN | OXYGEN SATURATION: 97 % | BODY MASS INDEX: 29.2 KG/M2 | RESPIRATION RATE: 20 BRPM | DIASTOLIC BLOOD PRESSURE: 58 MMHG | HEART RATE: 68 BPM

## 2018-05-23 DIAGNOSIS — I48.20 CHRONIC ATRIAL FIBRILLATION (HCC): Primary | ICD-10-CM

## 2018-05-23 DIAGNOSIS — Z95.0 CARDIAC PACEMAKER IN SITU: Primary | ICD-10-CM

## 2018-05-23 NOTE — PROGRESS NOTES
HISTORY OF PRESENTING ILLNESS      Shauna Harvey is a 80 y.o. male with history of AF, CAD/CABG, pacemaker, CHB, HTN, CKD, hyperlipidemia, chronic SOB, LVEF ranging between 40-50% here for follow up of PPM.  Device interrogation reveals normal device functioning however heart rate histogram demonstrates heart rates pinned at 70 bpm.  He has had shortness of breath and fatigue. ACTIVE PROBLEM LIST     Patient Active Problem List    Diagnosis Date Noted    Cardiomyopathy [facility]) 08/20/2017    Chronic atrial fibrillation ([facility] [address] 08/20/2017    Elective replacement indicated for cardiac pacemaker battery at end of lifespan 03/02/2017    Pacemaker 02/12/2017    Dyslipidemia 07/06/2015    Benign hypertensive heart disease with heart failure ([facility] [address] 04/04/2011    Chronic diastolic heart failure ([facility] [address] 04/04/2011    Coronary artery disease 03/01/2011    Heart block 09/17/2010    Chronic renal disease, stage III 09/14/2010    Venous insufficiency 09/14/2010           PAST MEDICAL HISTORY     Past Medical History:   Diagnosis Date    Advanced heart block     [address] Robert pacemaker Jan 2011    Atrial fibrillation [facility])     discovered Sept 2011    CAD (coronary artery disease), native coronary artery     Diastolic HF, no angina, 2v CABG 3/2/11 ([facility]), LIMA-LAD, VG-OM    Chronic kidney disease     Hypercholesterolemia     Hypertension     S/P CABG x 2 3/2011    Dr Carmen Blanc Symptomatic bradycardia     pacer           PAST SURGICAL HISTORY     Past Surgical History:   Procedure Laterality Date    CARDIAC CATHETERIZATION  2/24/11    Ostial LM 60%, documented by IVUS, mild LAD plaque. Circ and RCA normal. No LVG. EDP 30, wedge 20, RA 14.     ECHO 2D ADULT  2/2011    LVH, EF 60%    ECHO 2D ADULT  9/1/11    LVH, EF 45%, mild NEFTALY          ALLERGIES     Allergies   Allergen Reactions    Iron Anaphylaxis    Bumex [Bumetanide] Rash          FAMILY HISTORY     History reviewed. No pertinent family history. negative for cardiac disease       SOCIAL HISTORY     Social History     Social History    Marital status:      Spouse name: N/A    Number of children: N/A    Years of education: N/A     Social History Main Topics    Smoking status: Former Smoker     Quit date: 1/14/1988    Smokeless tobacco: Never Used      Comment: stopped 1988    Alcohol use No    Drug use: None    Sexual activity: Not Asked     Other Topics Concern    None     Social History Narrative         MEDICATIONS     Current Outpatient Prescriptions   Medication Sig    warfarin (COUMADIN) 5 mg tablet TAKE 1 TABLET DAILY OR AS DIRECTED    labetalol (NORMODYNE) 200 mg tablet TAKE 1 TABLET TWICE A DAY    furosemide (LASIX) 40 mg tablet TAKE 1 TABLET BY MOUTH EVERY DAY    ethacrynic acid (EDECRIN) 25 mg tablet Take 50 mg by mouth daily.  cloNIDine HCl (CATAPRES) 0.2 mg tablet Take 1 Tab by mouth two (2) times a day.  FERROUS SULFATE Take 65 mg by mouth.  levothyroxine (SYNTHROID) 50 mcg tablet Take  by mouth Daily (before breakfast).  sodium polystyrene (SPS) 15 gram/60 mL suspension Take 15 g by mouth See Admin Instructions. 3 times weekly    calcitRIOL (ROCALTROL) 0.25 mcg capsule Take 0.25 mcg by mouth daily.  losartan (COZAAR) 25 mg tablet Take 50 mg by mouth daily.  cholecalciferol, vitamin d3, (VITAMIN D) 1,000 unit tablet Take 50,000 Units by mouth daily. 50,000 iu every two weeks     aspirin 81 mg tablet Take  by mouth daily.  simvastatin (ZOCOR) 20 mg tablet Take  by mouth nightly. No current facility-administered medications for this visit. I have reviewed the nurses notes, vitals, problem list, allergy list, medical history, family, social history and medications. REVIEW OF SYMPTOMS      General: Pt denies excessive weight gain or loss. Pt is able to conduct ADL's  HEENT: Denies blurred vision, headaches, hearing loss, epistaxis and difficulty swallowing.   Respiratory: Denies cough, congestion, shortness of breath, TIMMONS, wheezing or stridor. Cardiovascular: Denies precordial pain, palpitations, edema or PND  Gastrointestinal: Denies poor appetite, indigestion, abdominal pain or blood in stool  Genitourinary: Denies hematuria, dysuria, increased urinary frequency  Musculoskeletal: Denies joint pain or swelling from muscles or joints  Neurologic: Denies tremor, paresthesias, headache, or sensory motor disturbance  Psychiatric: Denies confusion, insomnia, depression  Integumentray: Denies rash, itching or ulcers. Hematologic: Denies easy bruising, bleeding       PHYSICAL EXAMINATION      Vitals:    05/23/18 1525   BP: 114/58   Pulse: 68   Resp: 20   SpO2: 97%   Weight: 204 lb (92.5 kg)   Height: 5' 9.5\" (1.765 m)     General: Well developed, in no acute distress. HEENT: No jaundice, oral mucosa moist, no oral ulcers  Neck: Supple, no stiffness, no lymphadenopathy, supple  Heart:  Normal S1/S2 negative S3 or S4. Regular, no murmur, gallop or rub, no jugular venous distention  Respiratory: Clear bilaterally x 4, no wheezing or rales  Abdomen:   Soft, non-tender, bowel sounds are active.   Extremities:  No edema, normal cap refill, no cyanosis. Musculoskeletal: No clubbing, no deformities  Neuro: A&Ox3, speech clear, gait stable, cooperative, no focal neurologic deficits  Skin: Skin color is normal. No rashes or lesions.  Non diaphoretic, moist.  Vascular: 2+ pulses symmetric in all extremities       DIAGNOSTIC DATA      EKG:        LABORATORY DATA      Lab Results   Component Value Date/Time    WBC 9.0 03/07/2017 02:13 PM    Hemoglobin (POC) 10.2 (L) 02/07/2011 05:55 PM    HGB 10.4 (L) 03/07/2017 02:13 PM    Hematocrit (POC) 30 (L) 02/07/2011 05:55 PM    HCT 32.0 (L) 03/07/2017 02:13 PM    PLATELET 532 67/03/0356 02:13 PM    MCV 88 03/07/2017 02:13 PM      Lab Results   Component Value Date/Time    Sodium 144 03/07/2017 02:13 PM    Potassium 4.1 03/07/2017 02:13 PM    Chloride 101 03/07/2017 02:13 PM    CO2 24 03/07/2017 02:13 PM    Anion gap 9 03/08/2011 03:40 AM    Glucose 117 (H) 03/07/2017 02:13 PM    BUN 39 (H) 03/07/2017 02:13 PM    Creatinine 2.45 (H) 03/07/2017 02:13 PM    BUN/Creatinine ratio 16 03/07/2017 02:13 PM    GFR est AA 27 (L) 03/07/2017 02:13 PM    GFR est non-AA 23 (L) 03/07/2017 02:13 PM    Calcium 9.0 03/07/2017 02:13 PM    Bilirubin, total 0.5 03/08/2011 03:40 AM    AST (SGOT) 43 (H) 03/08/2011 03:40 AM    Alk. phosphatase 64 03/08/2011 03:40 AM    Protein, total 6.4 03/08/2011 03:40 AM    Albumin 2.9 (L) 03/08/2011 03:40 AM    Globulin 3.5 03/08/2011 03:40 AM    A-G Ratio 0.8 (L) 03/08/2011 03:40 AM    ALT (SGPT) 7 (L) 03/08/2011 03:40 AM           ASSESSMENT      1. Atrial fibrillation   2. CAD/CABG  3. Pacemaker                         A. SJM  4. Hypertension  5. Chronic kidney disease  6. Hyperlipidemia         PLAN     We will just rate response. Continue following in device clinic. Will defer upgrade to CRT given patient's age and comorbidities. FOLLOW-UP     1 year      Thank you, Shira Eastman MD and Dr. Amalia Edge for allowing me to participate in the care of this extraordinarily pleasant male. Please do not hesitate to contact me for further questions/concerns.          Talia Tee MD  Cardiac Electrophysiology / Cardiology    [address]  [facility], [address]  [phone] / [phone] Fax   [phone] / [phone] Fax

## 2018-05-23 NOTE — MR AVS SNAPSHOT
[address] [facility] [address] [facility] [facility] 
[phone] Patient: Deborah De MRN: [id number] Visit Information Date & Time Provider Department Dept. Phone Encounter #  
 5/23/2018  3:20 PM Billy Garcia MD [facility] Agapito Mcbride [phone] [id number] Your Appointments 6/4/2018  3:20 PM  
COUMADIN CLINIC with COUMADIN, HEDY  
[facility] ([place] SCHEDULING) Appt Note: f/u sov$0 crf 05/07/18  
 [address] [place]  
[address] [address] [address] [place] 8/17/2018  3:00 PM  
[facility]  
[facility] ([place] SCHEDULING) Appt Note: 6 mo fup echo at 3 at 4 20 dr Italo Levy [address] [place]  
[address] [place] [address] [address] [place] 8/17/2018  4:20 PM  
ESTABLISHED PATIENT with Kiah River MD  
[facility] ([place] SCHEDULING) Appt Note: 6 mo fup echo at 3 at 4 20 dr Italo Levy [address] [address]  
[phone]  
  
   
 [address] [facility] [address] [place] [address]  
  
    
 6/12/2019  3:15 PM  
[facility]  
[facility] ([place]) Appt Note: stj/pm/stf/merlin [address] [facility]  
[phone]  
  
   
 [address] [facility] [address] [facility] [address] [address]  
  
    
 6/19/2019  3:20 PM  
ESTABLISHED PATIENT with Billy Garcia MD  
[facility] ([facility] [place]) Appt Note: annual  
 [address] [place]  
[address] [address] [address] [place] 8/30/2018 10:00 AM  
REMOTE OFFICE VISIT with Dayne Jess [facility] ([place] SCHEDULING) Appt Note: merlin/pm/stf  
 [address] [facility] [address] [address]  
[phone]  
  
   
 [address] [facility] [address] [address] [address] [place]  
  
    
 12/6/2018 11:30 AM  
REMOTE OFFICE VISIT with Dayne Jess [facility] ([place] SCHEDULING) Appt Note: merlin/pm/stf  
 [address] [facility] [address] [facility]  
[phone]  
  
    
 3/7/2019 11:00 AM  
REMOTE OFFICE VISIT with Dayne Jess [facility] ([place] SCHEDULING) Appt Note: merlin/pm/stf  
 [address] [facility] [address] [facility]  
[phone] Upcoming Health Maintenance Date Due DTaP/Tdap/Td series (1 - Tdap) 2/23/1953 ZOSTER VACCINE AGE 60> 12/23/1991 GLAUCOMA SCREENING Q2Y 2/23/1997 Pneumococcal 65+ Low/Medium Risk (2 of 2 - PPSV23) 1/1/2011 MEDICARE YEARLY EXAM 3/14/2018 Influenza Age 5 to Adult 8/1/2018 Allergies as of 5/23/2018  Review Complete On: 5/23/2018 By: Rebekah Rhodes LPN Severity Noted Reaction Type Reactions Iron High 03/07/2013    Anaphylaxis Bumex [Bumetanide]  09/14/2010   Systemic Rash Current Immunizations  Reviewed on 3/5/2011 Name Date Influenza Vaccine Whole 11/1/2010 ZZZ-RETIRED (DO NOT USE) Pneumococcal Vaccine (Unspecified Type) 1/1/2006 Not reviewed this visit You Were Diagnosed With   
  
 Codes Comments Chronic atrial fibrillation (HCC)    -  Primary ICD-10-CM: C14.3 ICD-9-CM: 427.31 Vitals BP Pulse Resp Height(growth percentile) Weight(growth percentile) SpO2  
 114/58 (BP 1 Location: Left arm, BP Patient Position: Sitting) 68 20 5' 9.5\" (1.765 m) 204 lb (92.5 kg) 97% BMI Smoking Status 29.69 kg/m2 Former Smoker Vitals History BMI and BSA Data Body Mass Index Body Surface Area  
 29.69 kg/m 2 2.13 m 2 Preferred Pharmacy Pharmacy Name Phone [id number] [address] [phone] Your Updated Medication List  
  
   
This list is accurate as of 5/23/18  4:04 PM.  Always use your most recent med list.  
  
  
  
  
 aspirin 81 mg tablet Take  by mouth daily. calcitRIOL 0.25 mcg capsule Commonly known as:  ROCALTROL Take 0.25 mcg by mouth daily. cloNIDine HCl 0.2 mg tablet Commonly known as:  CATAPRES Take 1 Tab by mouth two (2) times a day.  
  
 ethacrynic acid 25 mg tablet Commonly known as:  EDECRIN Take 50 mg by mouth daily. FERROUS SULFATE Take 65 mg by mouth. furosemide 40 mg tablet Commonly known as:  LASIX TAKE 1 TABLET BY MOUTH EVERY DAY  
  
 labetalol 200 mg tablet Commonly known as:  NORMODYNE  
TAKE 1 TABLET TWICE A DAY  
  
 levothyroxine 50 mcg tablet Commonly known as:  SYNTHROID Take  by mouth Daily (before breakfast). losartan 25 mg tablet Commonly known as:  COZAAR Take 50 mg by mouth daily. SPS (WITH SORBITOL) 15 gram/60 mL suspension Generic drug:  sodium polystyrene Take 15 g by mouth See Admin Instructions. 3 times weekly VITAMIN D3 1,000 unit tablet Generic drug:  cholecalciferol Take 50,000 Units by mouth daily. 50,000 iu every two weeks  
  
 warfarin 5 mg tablet Commonly known as:  COUMADIN  
TAKE 1 TABLET DAILY OR AS DIRECTED  
  
 ZOCOR 20 mg tablet Generic drug:  simvastatin Take  by mouth nightly. We Performed the Following AMB POC EKG ROUTINE W/ 12 LEADS, INTER & REP [71238 CPT(R)] Introducing [facility]! Dannielle Raymond introduces [employer] patient portal. Now you can access parts of your medical record, email your doctor's office, and request medication refills online. 1. In your internet browser, go to [email or web address] 2. Click on the First Time User? Click Here link in the Sign In box. You will see the New Member Sign Up page. 3. Enter your [employer] Access Code exactly as it appears below. You will not need to use this code after youve completed the sign-up process. If you do not sign up before the expiration date, you must request a new code. · [employer] Access Code: [email or web address] Expires: 8/5/2018  2:42 PM 
 
4. Enter the last four digits of your Social Security Number (xxxx) and Date of Birth (mm/dd/yyyy) as indicated and click Submit. You will be taken to the next sign-up page. 5. Create a Wugly ID. This will be your [employer] login ID and cannot be changed, so think of one that is secure and easy to remember. 6. Create a [employer] password. You can change your password at any time. 7. Enter your Password Reset Question and Answer. This can be used at a later time if you forget your password. 8. Enter your e-mail address. You will receive e-mail notification when new information is available in [address] 9. Click Sign Up. You can now view and download portions of your medical record. 10. Click the Download Summary menu link to download a portable copy of your medical information. If you have questions, please visit the Frequently Asked Questions section of the [employer] website. Remember, [employer] is NOT to be used for urgent needs. For medical emergencies, dial 911. Now available from your iPhone and Android! Please provide this summary of care documentation to your next provider. Your primary care clinician is listed as Jose Miguel Obregon. If you have any questions after today's visit, please call [phone].

## 2018-05-23 NOTE — PROGRESS NOTES
Visit Vitals    /58 (BP 1 Location: Left arm, BP Patient Position: Sitting)    Pulse 68    Resp 20    Ht 5' 9.5\" (1.765 m)    Wt 204 lb (92.5 kg)    SpO2 97%    BMI 29.69 kg/m2

## 2018-08-17 NOTE — PROGRESS NOTES
Farooq Nazario, [address]  [address],  [address]  [place], [address] [facility]    Office [phone]  Fax [phone]  Cell [phone]    HISTORY OF PRESENT ILLNESS  Sarah Reddy is a 80 y.o. male. Last seen 6 months ago. Past Medical History:   Diagnosis Date    Advanced heart block     [address] Robert pacemaker Jan 2011    Atrial fibrillation [facility])     discovered Sept 2011    CAD (coronary artery disease), native coronary artery     Diastolic HF, no angina, 2v CABG 3/2/11 [facility]), LIMA-LAD, VG-OM    Chronic kidney disease     Hypercholesterolemia     Hypertension     S/P CABG x 2 3/2011    Dr Sam Mariano    Symptomatic bradycardia     pacer     Cardiac testing  1/4/11- [facility] pacemaker per Dr. Yannick Cao    2v CABG 3/2/11 [facility]), LIMA-LAD, VG-OM   Advanced heart block - [facility] pacemaker Jan 2011   Cardiac catheterization 2/24/11- Ostial LM 60%, documented by IVUS, mild LAD plaque. Circ and RCA normal. No LVG. EDP 30, wedge 20, RA 14.   Echo 2d adult 2/2011 - LVH, EF 60%   Echo 2d adult 9/1/11- LVH, EF 45%, mild NEFTALY   Lexiscan cardiolite 9/10/13 - normal perfusion, EF 47%  Echo 2/5/14 - EF 06%, grade 3 diastolic dysfunction, ventricular septal paradoxical motion, mild to mod LAE, mild MR, mod PA HTN. Echo 7/11/16 - EF 40 % to 45 %. Moderate hypokinesis of the apical wall(s). Moderate LAE. Dilated right atrium. Mild MAC with mild to moderate MR. AoV sclerosis without stenosis. Moderate TR with PASP 51mmHg. Moderate Pulm HTN. Interval decline in LVEF noted. PA systolic pressure has increased when compared to 05-Feb-2014. Lexiscan Cardiolite 7/11/16 - Normal perfusion. EF 51%. 3/10/17- [facility] pacemaker generator change 3/10/17 per Dr. Elvia Pascual  Echo 8/14/17- LVEF 42%, LVH, moderate LAE      HPI  Mr. Rudy Mittal feels well overall with no interval issues. He has been active fishing and doing yard work. He occasionally develops mild shortness of breath, which is not disruptive to his daily life. He gets routine remote PPM checks. Patient denies any exertional chest pain, dyspnea, palpitations, syncope, orthopnea, edema or paroxysmal nocturnal dyspnea. He has had no bleeding issues on Coumadin. He gets routine blood work with Manuel Bunn and Ralf. Current Outpatient Prescriptions   Medication Sig Dispense Refill    furosemide (LASIX) 40 mg tablet Take 20 mg by mouth daily.  ergocalciferol (VITAMIN D2) 50,000 unit capsule Take 50,000 Units by mouth Once every 2 weeks.  warfarin (COUMADIN) 5 mg tablet TAKE 1 TABLET DAILY OR AS DIRECTED 90 Tab 2    labetalol (NORMODYNE) 200 mg tablet TAKE 1 TABLET TWICE A  Tab 3    ethacrynic acid (EDECRIN) 25 mg tablet Take 50 mg by mouth daily.  cloNIDine HCl (CATAPRES) 0.2 mg tablet Take 1 Tab by mouth two (2) times a day. 180 Tab 3    FERROUS SULFATE Take 65 mg by mouth.  levothyroxine (SYNTHROID) 50 mcg tablet Take  by mouth Daily (before breakfast).  sodium polystyrene (SPS) 15 gram/60 mL suspension Take 15 g by mouth See Admin Instructions. 3 times weekly      calcitRIOL (ROCALTROL) 0.25 mcg capsule Take 0.25 mcg by mouth daily.  losartan (COZAAR) 25 mg tablet Take 50 mg by mouth daily.  aspirin 81 mg tablet Take  by mouth daily.  simvastatin (ZOCOR) 20 mg tablet Take  by mouth nightly. Allergies   Allergen Reactions    Iron Anaphylaxis    Bumex [Bumetanide] Rash     Retired     Review of Systems  Constitutional: Negative for fever, chills, malaise/fatigue and diaphoresis. Respiratory: Negative for cough, hemoptysis, sputum production, and wheezing. Positive for mild TIMMONS. Cardiovascular: Negative for chest pain, palpitations, orthopnea, claudication, leg swelling and PND. Gastrointestinal: Negative for heartburn, nausea, vomiting, blood in stool and melena. Genitourinary: Negative for dysuria and flank pain. Musculoskeletal: Negative for joint pain and back pain.   Skin: Negative for rash. Neurological: Negative for focal weakness, seizures, loss of consciousness, weakness and headaches. Endo/Heme/Allergies: Does not bruise/bleed easily. Psychiatric/Behavioral: Negative for memory loss. The patient does not have insomnia. Visit Vitals    /70 (BP 1 Location: Left arm)    Pulse 72    Resp 18    Ht 5' 9.5\" (1.765 m)    Wt 202 lb 11.2 oz (91.9 kg)    SpO2 99%    BMI 29.5 kg/m2     Wt Readings from Last 3 Encounters:   08/17/18 202 lb 11.2 oz (91.9 kg)   05/23/18 204 lb (92.5 kg)   02/13/18 206 lb 8 oz (93.7 kg)     Physical Exam   Vitals reviewed. Constitutional: He is oriented to person, place, and time. He appears well-developed. Head: Normocephalic. Neck: Neck supple. No JVD present. No tracheal deviation present. Cardiovascular: Normal rate, regular rhythm, normal heart sounds and intact distal pulses. Exam reveals no gallop and no friction rub. No murmur heard. Sternotomy scar well healed. Pulmonary/Chest: left basal crackles. Abdominal: Bowel sounds are normal. No tenderness. He has no rebound. Musculoskeletal: He exhibits no edema  Neurological: He is alert and oriented to person, place, and time. Skin: Skin is warm and dry. Psychiatric: He has a normal mood and affect. Cardiographics  Echo March 2013 - EF 50%, prominent septal dyssynchrony  Echo 2/5/14 - EF 40%, prominent septal dyssynchrony, PA sys 50  CXR 1/27/14 - bilateral effusions, left > right  Labs 1/28/13 - , Cr 2.03, K 4.2  EKG 8/9/16 - V paced with likely underlying AF  Echo 8/14/17- LVEF 42%, LVH, moderate LAE  Echo 8/17/18 - EF 45%, moderate-severe LAE, PA systolic 50    ASSESSMENT and PLAN  Encounter Diagnoses   Name Primary?     Coronary artery disease involving native coronary artery of native heart without angina pectoris Yes    Ischemic cardiomyopathy     Chronic atrial fibrillation (HCC)     Pacemaker     Dyslipidemia     Venous insufficiency     Chronic diastolic heart failure ([facility] [address]      CAD s/p CABG 2011. Stress nuclear study 7/2016 was normal. He has no sxs of angina. Continue aspirin 81 mg daily plus statin therapy. Chronic AF/conduction disease s/p PPM. Chronically RV paced. Pacer function has been normal. Continue Warfarin, target INR 2-3. He is followed by our Coumadin clinic. HFpEF class 2. EF remains stable at 45% by echo today. LV dysfunction likely due to chronic RV pacing. Stable class 2 HF. HTN, controlled on combination therapy. CKD, stage 4. Followed by Dr. Kam Aviles. CRE 4/2018 3.2. Dyslipidemia and DM managed by Kirt Sanchez MD. Recent A1c was 6.1% on no specific treatment other than diet. Recent LDL-c 99. Follow-up Disposition:  Return in about 6 months (around 2/17/2019). Written by Cade Heredia, as dictated by Dr. Socorro White.      Socorro White MD

## 2018-08-17 NOTE — MR AVS SNAPSHOT
[address] [facility] 
[phone] Patient: Claud Mcburney MRN: [id number] Visit Information Date & Time Provider Department Dept. Phone Encounter #  
 8/17/2018  4:20 PM Jory Dang MD [facility] Kali Haile [phone] [id number] Follow-up Instructions Return in about 6 months (around 2/17/2019). Your Appointments 8/17/2018  4:20 PM  
ESTABLISHED PATIENT with Jory Dang MD  
[facility] ([facility] SCH[facility]) Appt Note: 6 mo fup echo at 3 at 4 20 dr Marshall Peerz [address] [facility] [address]  
[phone]  
  
   
 [address] [address]  
  
    
 8/28/2018  3:00 PM  
COUMADIN CLINIC with COUMADINLAWANDA  
[facility] Appt Note: f/u sov$0 crf 07/30/18  
 [address] [facility] [address]  
[phone]  
  
   
 [address] [address]  
  
    
 6/12/2019  3:15 PM  
PACEMAKER with PACEMAKER, MAEVE  
[facility] ([facility]) Appt Note: stj/pm/stf/merlin [address] [facility]  
[phone]  
  
   
 [address] [address]  
  
    
 6/19/2019  3:20 PM  
ESTABLISHED PATIENT with Christy Kayser, MD  
[facility] ([facility]) Appt Note: annual  
 [address] [facility]  
[phone]  
  
   
 [address] [address] [address]  
  
    
  
 8/30/2018 10:00 AM  
REMOTE OFFICE VISIT with Hieu Rizvi [facility] ([facility] SCHEDULING) Appt Note: merlin/pm/stf  
 [address] [facility]  
[phone] [address]  
  
    
 12/6/2018 11:30 AM  
REMOTE OFFICE VISIT with Kayla Mohamud [facility] ([facility] SCHEDULING) Appt Note: merlin/pm/stf  
 [address] [facility]  
[phone]  
  
    
 3/7/2019 11:00 AM  
REMOTE OFFICE VISIT with Kayla Mohamud [facility] ([facility]PHANIE SCHEDULING) Appt Note: merlin/pm/stf  
 [address] [facility]  
[phone] Upcoming Health Maintenance Date Due DTaP/Tdap/Td series (1 - Tdap) 2/23/1953 ZOSTER VACCINE AGE 60> 12/23/1991 GLAUCOMA SCREENING Q2Y 2/23/1997 Pneumococcal 65+ Low/Medium Risk (2 of 2 - PPSV23) 1/1/2011 MEDICARE YEARLY EXAM 3/14/2018 Influenza Age 5 to Adult 8/1/2018 Allergies as of 8/17/2018  Review Complete On: 8/17/2018 By: Drew Haas Severity Noted Reaction Type Reactions Iron High 03/07/2013    Anaphylaxis Bumex [Bumetanide]  09/14/2010   Systemic Rash Current Immunizations  Reviewed on 3/5/2011 Name Date Influenza Vaccine Whole 11/1/2010 ZZZ-RETIRED (DO NOT USE) Pneumococcal Vaccine (Unspecified Type) 1/1/2006 Not reviewed this visit You Were Diagnosed With   
  
 Codes Comments Coronary artery disease involving native coronary artery of native heart without angina pectoris    -  Primary ICD-10-CM: I25.10 ICD-9-CM: 414.01 Ischemic cardiomyopathy     ICD-10-CM: I25.5 ICD-9-CM: 414.8 Chronic atrial fibrillation (HCC)     ICD-10-CM: I52.7 ICD-9-CM: 427.31 Pacemaker     ICD-10-CM: Z95.0 ICD-9-CM: V45.01 Dyslipidemia     ICD-10-CM: E78.5 ICD-9-CM: 272.4 Venous insufficiency     ICD-10-CM: I87.2 ICD-9-CM: 459.81 Chronic diastolic heart failure (HCC)     ICD-10-CM: I50.32 
ICD-9-CM: 428.32 Vitals BP Pulse Resp Height(growth percentile) Weight(growth percentile) SpO2 142/70 (BP 1 Location: Left arm) 72 18 5' 9.5\" (1.765 m) 202 lb 11.2 oz (91.9 kg) 99% BMI Smoking Status 29.5 kg/m2 Former Smoker Vitals History BMI and BSA Data Body Mass Index Body Surface Area  
 29.5 kg/m 2 2.12 m 2 Preferred Pharmacy Pharmacy Name Phone [id number] [address] [phone] Your Updated Medication List  
  
   
This list is accurate as of 8/17/18  4:19 PM.  Always use your most recent med list.  
  
  
  
  
 aspirin 81 mg tablet Take  by mouth daily. calcitRIOL 0.25 mcg capsule Commonly known as:  ROCALTROL Take 0.25 mcg by mouth daily. cloNIDine HCl 0.2 mg tablet Commonly known as:  CATAPRES Take 1 Tab by mouth two (2) times a day.  
  
 ethacrynic acid 25 mg tablet Commonly known as:  EDECRIN Take 50 mg by mouth daily. FERROUS SULFATE Take 65 mg by mouth. labetalol 200 mg tablet Commonly known as:  NORMODYNE  
TAKE 1 TABLET TWICE A DAY  
  
 LASIX 40 mg tablet Generic drug:  furosemide Take 20 mg by mouth daily. levothyroxine 50 mcg tablet Commonly known as:  SYNTHROID Take  by mouth Daily (before breakfast). losartan 25 mg tablet Commonly known as:  COZAAR Take 50 mg by mouth daily. SPS (WITH SORBITOL) 15 gram/60 mL suspension Generic drug:  sodium polystyrene Take 15 g by mouth See Admin Instructions. 3 times weekly VITAMIN D2 50,000 unit capsule Generic drug:  ergocalciferol Take 50,000 Units by mouth Once every 2 weeks. warfarin 5 mg tablet Commonly known as:  COUMADIN  
TAKE 1 TABLET DAILY OR AS DIRECTED  
  
 ZOCOR 20 mg tablet Generic drug:  simvastatin Take  by mouth nightly. Follow-up Instructions Return in about 6 months (around 2/17/2019). Introducing [facility]!    
 [employer] introduces [employer] patient portal. Now you can access parts of your medical record, email your doctor's office, and request medication refills online. 1. In your internet browser, go to [email or web address] 2. Click on the First Time User? Click Here link in the Sign In box. You will see the New Member Sign Up page. 3. Enter your [employer] Access Code exactly as it appears below. You will not need to use this code after youve completed the sign-up process. If you do not sign up before the expiration date, you must request a new code. · [employer] Access Code: [email or web address] Expires: 11/15/2018  4:19 PM 
 
4. Enter the last four digits of your Social Security Number (xxxx) and Date of Birth (mm/dd/yyyy) as indicated and click Submit. You will be taken to the next sign-up page. 5. Create a [employer] ID. This will be your [employer] login ID and cannot be changed, so think of one that is secure and easy to remember. 6. Create a [employer] password. You can change your password at any time. 7. Enter your Password Reset Question and Answer. This can be used at a later time if you forget your password. 8. Enter your e-mail address. You will receive e-mail notification when new information is available in [address] 9. Click Sign Up. You can now view and download portions of your medical record. 10. Click the Download Summary menu link to download a portable copy of your medical information. If you have questions, please visit the Frequently Asked Questions section of the [employer] website. Remember, [employer] is NOT to be used for urgent needs. For medical emergencies, dial 911. Now available from your iPhone and Android! Please provide this summary of care documentation to your next provider. Your primary care clinician is listed as Guille Zaidi. If you have any questions after today's visit, please call [phone].

## 2019-01-01 ENCOUNTER — OFFICE VISIT (OUTPATIENT)
Dept: CARDIOLOGY CLINIC | Age: 84
End: 2019-01-01

## 2019-01-01 ENCOUNTER — ANTI-COAG VISIT (OUTPATIENT)
Dept: CARDIOLOGY CLINIC | Age: 84
End: 2019-01-01

## 2019-01-01 ENCOUNTER — HOSPITAL ENCOUNTER (INPATIENT)
Age: 84
LOS: 1 days | DRG: 811 | End: 2019-06-02
Attending: EMERGENCY MEDICINE | Admitting: INTERNAL MEDICINE
Payer: MEDICARE

## 2019-01-01 ENCOUNTER — APPOINTMENT (OUTPATIENT)
Dept: GENERAL RADIOLOGY | Age: 84
DRG: 811 | End: 2019-01-01
Attending: EMERGENCY MEDICINE
Payer: MEDICARE

## 2019-01-01 VITALS
BODY MASS INDEX: 28.29 KG/M2 | WEIGHT: 197.6 LBS | HEART RATE: 67 BPM | HEIGHT: 70 IN | SYSTOLIC BLOOD PRESSURE: 130 MMHG | OXYGEN SATURATION: 97 % | DIASTOLIC BLOOD PRESSURE: 60 MMHG

## 2019-01-01 VITALS
OXYGEN SATURATION: 98 % | HEIGHT: 69 IN | SYSTOLIC BLOOD PRESSURE: 118 MMHG | WEIGHT: 194 LBS | DIASTOLIC BLOOD PRESSURE: 57 MMHG | RESPIRATION RATE: 17 BRPM | BODY MASS INDEX: 28.73 KG/M2 | HEART RATE: 74 BPM | TEMPERATURE: 97.6 F

## 2019-01-01 DIAGNOSIS — Z95.0 PACEMAKER: Primary | ICD-10-CM

## 2019-01-01 DIAGNOSIS — E87.5 ACUTE HYPERKALEMIA: ICD-10-CM

## 2019-01-01 DIAGNOSIS — Z95.0 CARDIAC PACEMAKER IN SITU: ICD-10-CM

## 2019-01-01 DIAGNOSIS — I48.20 CHRONIC ATRIAL FIBRILLATION (HCC): ICD-10-CM

## 2019-01-01 DIAGNOSIS — R79.89 ELEVATED BRAIN NATRIURETIC PEPTIDE (BNP) LEVEL: ICD-10-CM

## 2019-01-01 DIAGNOSIS — R53.83 FATIGUE, UNSPECIFIED TYPE: ICD-10-CM

## 2019-01-01 DIAGNOSIS — D64.9 SYMPTOMATIC ANEMIA: Primary | ICD-10-CM

## 2019-01-01 DIAGNOSIS — I25.5 ISCHEMIC CARDIOMYOPATHY: ICD-10-CM

## 2019-01-01 DIAGNOSIS — Z95.0 CARDIAC PACEMAKER IN SITU: Primary | ICD-10-CM

## 2019-01-01 DIAGNOSIS — E78.5 DYSLIPIDEMIA: ICD-10-CM

## 2019-01-01 DIAGNOSIS — N28.9 KIDNEY DISEASE: ICD-10-CM

## 2019-01-01 DIAGNOSIS — I87.2 VENOUS INSUFFICIENCY: ICD-10-CM

## 2019-01-01 DIAGNOSIS — N18.30 CHRONIC RENAL DISEASE, STAGE III (HCC): Chronic | ICD-10-CM

## 2019-01-01 DIAGNOSIS — I50.32 CHRONIC DIASTOLIC HEART FAILURE (HCC): ICD-10-CM

## 2019-01-01 DIAGNOSIS — Z95.0 PACEMAKER: ICD-10-CM

## 2019-01-01 DIAGNOSIS — I25.10 CORONARY ARTERY DISEASE INVOLVING NATIVE CORONARY ARTERY OF NATIVE HEART WITHOUT ANGINA PECTORIS: Chronic | ICD-10-CM

## 2019-01-01 DIAGNOSIS — I25.5 ISCHEMIC CARDIOMYOPATHY: Primary | ICD-10-CM

## 2019-01-01 DIAGNOSIS — B33.24 VIRAL CARDIOMYOPATHY (HCC): ICD-10-CM

## 2019-01-01 DIAGNOSIS — I25.10 CORONARY ARTERY DISEASE INVOLVING NATIVE CORONARY ARTERY OF NATIVE HEART WITHOUT ANGINA PECTORIS: ICD-10-CM

## 2019-01-01 LAB
ABO + RH BLD: NORMAL
ALBUMIN SERPL-MCNC: 2.6 G/DL (ref 3.5–5)
ALBUMIN/GLOB SERPL: 0.8 {RATIO} (ref 1.1–2.2)
ALP SERPL-CCNC: 81 U/L (ref 45–117)
ALT SERPL-CCNC: 15 U/L (ref 12–78)
ANION GAP SERPL CALC-SCNC: 8 MMOL/L (ref 5–15)
AST SERPL-CCNC: 27 U/L (ref 15–37)
ATRIAL RATE: 85 BPM
BASOPHILS # BLD: 0.1 K/UL (ref 0–0.1)
BASOPHILS NFR BLD: 1 % (ref 0–1)
BILIRUB SERPL-MCNC: 0.5 MG/DL (ref 0.2–1)
BLD PROD TYP BPU: NORMAL
BLD PROD TYP BPU: NORMAL
BLOOD GROUP ANTIBODIES SERPL: NORMAL
BNP SERPL-MCNC: 3112 PG/ML
BPU ID: NORMAL
BPU ID: NORMAL
BUN SERPL-MCNC: 126 MG/DL (ref 6–20)
BUN/CREAT SERPL: 22 (ref 12–20)
CALCIUM SERPL-MCNC: 8.8 MG/DL (ref 8.5–10.1)
CALCULATED R AXIS, ECG10: -80 DEGREES
CALCULATED T AXIS, ECG11: 65 DEGREES
CHLORIDE SERPL-SCNC: 113 MMOL/L (ref 97–108)
CO2 SERPL-SCNC: 18 MMOL/L (ref 21–32)
COMMENT, HOLDF: NORMAL
CREAT SERPL-MCNC: 5.81 MG/DL (ref 0.7–1.3)
CROSSMATCH RESULT,%XM: NORMAL
CROSSMATCH RESULT,%XM: NORMAL
DIAGNOSIS, 93000: NORMAL
DIFFERENTIAL METHOD BLD: ABNORMAL
EOSINOPHIL # BLD: 0.2 K/UL (ref 0–0.4)
EOSINOPHIL NFR BLD: 1 % (ref 0–7)
ERYTHROCYTE [DISTWIDTH] IN BLOOD BY AUTOMATED COUNT: 16.1 % (ref 11.5–14.5)
GLOBULIN SER CALC-MCNC: 3.1 G/DL (ref 2–4)
GLUCOSE SERPL-MCNC: 132 MG/DL (ref 65–100)
HCT VFR BLD AUTO: 22.2 % (ref 36.6–50.3)
HEMOCCULT STL QL: POSITIVE
HGB BLD-MCNC: 7.1 G/DL (ref 12.1–17)
IMM GRANULOCYTES # BLD AUTO: 0.1 K/UL (ref 0–0.04)
IMM GRANULOCYTES NFR BLD AUTO: 1 % (ref 0–0.5)
INR BLD: 1.9
INR BLD: 3
INR BLD: 3.1
INR BLD: 3.5
INR BLD: 4.1
INR PPP: 5.2 (ref 0.9–1.1)
INR, EXTERNAL: 1.9 (ref 2–3)
INR, EXTERNAL: 3 (ref 2–3)
INR, EXTERNAL: 3.1 (ref 2–3)
INR, EXTERNAL: 3.5 (ref 2–3)
INR, EXTERNAL: 4.1 (ref 2–3)
LACTATE BLD-SCNC: 0.69 MMOL/L (ref 0.4–2)
LYMPHOCYTES # BLD: 1 K/UL (ref 0.8–3.5)
LYMPHOCYTES NFR BLD: 9 % (ref 12–49)
MCH RBC QN AUTO: 30.6 PG (ref 26–34)
MCHC RBC AUTO-ENTMCNC: 32 G/DL (ref 30–36.5)
MCV RBC AUTO: 95.7 FL (ref 80–99)
MONOCYTES # BLD: 0.6 K/UL (ref 0–1)
MONOCYTES NFR BLD: 6 % (ref 5–13)
NEUTS SEG # BLD: 8.6 K/UL (ref 1.8–8)
NEUTS SEG NFR BLD: 82 % (ref 32–75)
NRBC # BLD: 0 K/UL (ref 0–0.01)
NRBC BLD-RTO: 0 PER 100 WBC
P-R INTERVAL, ECG05: 198 MS
PLATELET # BLD AUTO: 277 K/UL (ref 150–400)
PMV BLD AUTO: 11.4 FL (ref 8.9–12.9)
POTASSIUM SERPL-SCNC: 6.4 MMOL/L (ref 3.5–5.1)
POTASSIUM SERPL-SCNC: ABNORMAL MMOL/L (ref 3.5–5.1)
PROT SERPL-MCNC: 5.7 G/DL (ref 6.4–8.2)
PROTHROMBIN TIME: 48.2 SEC (ref 9–11.1)
PT POC: 20.2 SECONDS
PT POC: 37.2 SECONDS
PT POC: 41.2 SECONDS
PT POC: 43.2 SECONDS
PT POC: 49.4 SECONDS
Q-T INTERVAL, ECG07: 470 MS
QRS DURATION, ECG06: 154 MS
QTC CALCULATION (BEZET), ECG08: 499 MS
RBC # BLD AUTO: 2.32 M/UL (ref 4.1–5.7)
SAMPLES BEING HELD,HOLD: NORMAL
SODIUM SERPL-SCNC: 139 MMOL/L (ref 136–145)
SPECIMEN EXP DATE BLD: NORMAL
STATUS OF UNIT,%ST: NORMAL
STATUS OF UNIT,%ST: NORMAL
TROPONIN I SERPL-MCNC: <0.05 NG/ML
UNIT DIVISION, %UDIV: 0
UNIT DIVISION, %UDIV: 0
VALID INTERNAL CONTROL?: YES
VENTRICULAR RATE, ECG03: 68 BPM
WBC # BLD AUTO: 10.5 K/UL (ref 4.1–11.1)

## 2019-01-01 PROCEDURE — 86900 BLOOD TYPING SEROLOGIC ABO: CPT

## 2019-01-01 PROCEDURE — 36415 COLL VENOUS BLD VENIPUNCTURE: CPT

## 2019-01-01 PROCEDURE — 74011000258 HC RX REV CODE- 258: Performed by: EMERGENCY MEDICINE

## 2019-01-01 PROCEDURE — 83605 ASSAY OF LACTIC ACID: CPT

## 2019-01-01 PROCEDURE — 93005 ELECTROCARDIOGRAM TRACING: CPT

## 2019-01-01 PROCEDURE — 99285 EMERGENCY DEPT VISIT HI MDM: CPT

## 2019-01-01 PROCEDURE — 82272 OCCULT BLD FECES 1-3 TESTS: CPT

## 2019-01-01 PROCEDURE — 74011250636 HC RX REV CODE- 250/636: Performed by: NURSE PRACTITIONER

## 2019-01-01 PROCEDURE — 65270000029 HC RM PRIVATE

## 2019-01-01 PROCEDURE — 74011250637 HC RX REV CODE- 250/637: Performed by: INTERNAL MEDICINE

## 2019-01-01 PROCEDURE — 74011000250 HC RX REV CODE- 250: Performed by: EMERGENCY MEDICINE

## 2019-01-01 PROCEDURE — 85025 COMPLETE CBC W/AUTO DIFF WBC: CPT

## 2019-01-01 PROCEDURE — 83880 ASSAY OF NATRIURETIC PEPTIDE: CPT

## 2019-01-01 PROCEDURE — 87040 BLOOD CULTURE FOR BACTERIA: CPT

## 2019-01-01 PROCEDURE — 85610 PROTHROMBIN TIME: CPT

## 2019-01-01 PROCEDURE — 84484 ASSAY OF TROPONIN QUANT: CPT

## 2019-01-01 PROCEDURE — 84132 ASSAY OF SERUM POTASSIUM: CPT

## 2019-01-01 PROCEDURE — 80053 COMPREHEN METABOLIC PANEL: CPT

## 2019-01-01 PROCEDURE — 74011250636 HC RX REV CODE- 250/636: Performed by: EMERGENCY MEDICINE

## 2019-01-01 PROCEDURE — 86920 COMPATIBILITY TEST SPIN: CPT

## 2019-01-01 PROCEDURE — 74011636637 HC RX REV CODE- 636/637: Performed by: EMERGENCY MEDICINE

## 2019-01-01 PROCEDURE — 96360 HYDRATION IV INFUSION INIT: CPT

## 2019-01-01 PROCEDURE — 74011250636 HC RX REV CODE- 250/636: Performed by: INTERNAL MEDICINE

## 2019-01-01 RX ORDER — CALCIUM GLUCONATE 94 MG/ML
1 INJECTION, SOLUTION INTRAVENOUS
Status: COMPLETED | OUTPATIENT
Start: 2019-01-01 | End: 2019-01-01

## 2019-01-01 RX ORDER — SODIUM CHLORIDE 9 MG/ML
250 INJECTION, SOLUTION INTRAVENOUS AS NEEDED
Status: DISCONTINUED | OUTPATIENT
Start: 2019-01-01 | End: 2019-01-01 | Stop reason: HOSPADM

## 2019-01-01 RX ORDER — LABETALOL 200 MG/1
TABLET, FILM COATED ORAL
Qty: 180 TAB | Refills: 3 | Status: SHIPPED | OUTPATIENT
Start: 2019-01-01

## 2019-01-01 RX ORDER — LORAZEPAM 2 MG/ML
1 CONCENTRATE ORAL
Status: DISCONTINUED | OUTPATIENT
Start: 2019-01-01 | End: 2019-01-01 | Stop reason: HOSPADM

## 2019-01-01 RX ORDER — DIPHENHYDRAMINE HCL 25 MG
25 CAPSULE ORAL
COMMUNITY

## 2019-01-01 RX ORDER — LANOLIN ALCOHOL/MO/W.PET/CERES
325 CREAM (GRAM) TOPICAL
COMMUNITY

## 2019-01-01 RX ORDER — GLYCOPYRROLATE 0.2 MG/ML
0.2 INJECTION INTRAMUSCULAR; INTRAVENOUS
Status: DISCONTINUED | OUTPATIENT
Start: 2019-01-01 | End: 2019-01-01 | Stop reason: HOSPADM

## 2019-01-01 RX ORDER — SODIUM BICARBONATE 84 MG/ML
50 INJECTION, SOLUTION INTRAVENOUS
Status: COMPLETED | OUTPATIENT
Start: 2019-01-01 | End: 2019-01-01

## 2019-01-01 RX ORDER — OXYCODONE HYDROCHLORIDE 5 MG/1
5 TABLET ORAL
Status: DISCONTINUED | OUTPATIENT
Start: 2019-01-01 | End: 2019-01-01 | Stop reason: HOSPADM

## 2019-01-01 RX ORDER — SODIUM CHLORIDE 450 MG/100ML
150 INJECTION, SOLUTION INTRAVENOUS CONTINUOUS
Status: DISCONTINUED | OUTPATIENT
Start: 2019-01-01 | End: 2019-01-01

## 2019-01-01 RX ORDER — WARFARIN SODIUM 5 MG/1
TABLET ORAL
Qty: 90 TAB | Refills: 1 | Status: SHIPPED | OUTPATIENT
Start: 2019-01-01

## 2019-01-01 RX ORDER — PROCHLORPERAZINE EDISYLATE 5 MG/ML
10 INJECTION INTRAMUSCULAR; INTRAVENOUS
Status: DISCONTINUED | OUTPATIENT
Start: 2019-01-01 | End: 2019-01-01 | Stop reason: HOSPADM

## 2019-01-01 RX ORDER — ONDANSETRON 4 MG/1
4 TABLET, ORALLY DISINTEGRATING ORAL
Status: DISCONTINUED | OUTPATIENT
Start: 2019-01-01 | End: 2019-01-01 | Stop reason: HOSPADM

## 2019-01-01 RX ORDER — DEXTROSE MONOHYDRATE 25 G/50ML
50 INJECTION, SOLUTION INTRAVENOUS
Status: COMPLETED | OUTPATIENT
Start: 2019-01-01 | End: 2019-01-01

## 2019-01-01 RX ORDER — LOSARTAN POTASSIUM 100 MG/1
100 TABLET ORAL DAILY
COMMUNITY

## 2019-01-01 RX ORDER — LEVOTHYROXINE SODIUM 75 UG/1
75 TABLET ORAL
COMMUNITY

## 2019-01-01 RX ORDER — WARFARIN SODIUM 5 MG/1
TABLET ORAL
Qty: 90 TAB | Refills: 1 | Status: SHIPPED | OUTPATIENT
Start: 2019-01-01 | End: 2019-01-01 | Stop reason: SDUPTHER

## 2019-01-01 RX ORDER — ALLOPURINOL 100 MG/1
100 TABLET ORAL DAILY
COMMUNITY

## 2019-01-01 RX ADMIN — CALCIUM GLUCONATE 1 G: 98 INJECTION, SOLUTION INTRAVENOUS at 20:38

## 2019-01-01 RX ADMIN — LORAZEPAM 1 MG: 2 SOLUTION, CONCENTRATE ORAL at 02:08

## 2019-01-01 RX ADMIN — LORAZEPAM 1 MG: 2 SOLUTION, CONCENTRATE ORAL at 12:27

## 2019-01-01 RX ADMIN — INSULIN HUMAN 10 UNITS: 100 INJECTION, SOLUTION PARENTERAL at 20:37

## 2019-01-01 RX ADMIN — ONDANSETRON 4 MG: 4 TABLET, ORALLY DISINTEGRATING ORAL at 21:21

## 2019-01-01 RX ADMIN — SODIUM CHLORIDE 1000 ML: 900 INJECTION, SOLUTION INTRAVENOUS at 19:20

## 2019-01-01 RX ADMIN — DEXTROSE MONOHYDRATE 25 G: 25 INJECTION, SOLUTION INTRAVENOUS at 20:38

## 2019-01-01 RX ADMIN — SODIUM BICARBONATE 50 MEQ: 84 INJECTION, SOLUTION INTRAVENOUS at 20:38

## 2019-01-01 RX ADMIN — PROCHLORPERAZINE EDISYLATE 10 MG: 5 INJECTION INTRAMUSCULAR; INTRAVENOUS at 22:23

## 2019-02-25 NOTE — PROGRESS NOTES
Farooq Wilde MD [facility] [address] [facility], [address] [facility] Office [phone] Fax [phone] Cell [phone] OF PRESENT ILLNESS Migdalia Pallas is a 80 y.o. male. Last seen 6 months ago. Past Medical History:  
Diagnosis Date  Advanced heart block [facility] pacemaker Jan 2011  Atrial fibrillation ([address] discovered Sept 2011  CAD (coronary artery disease), native coronary artery Diastolic HF, no angina, 2v CABG 3/2/11 [facility]), LIMA-LAD, VG-OM  Chronic kidney disease  Hypercholesterolemia  Hypertension  S/P CABG x 2 3/2011 Dr Do Cueto  Symptomatic bradycardia   
 pacer Cardiac testing 1/4/11- [facility] pacemaker per Dr. Dm Davis 2v CABG 3/2/11 [facility]), LIMA-LAD, VG-OM Advanced heart block - [facility] pacemaker Jan 2011 Cardiac catheterization 2/24/11- Ostial LM 60%, documented by IVUS, mild LAD plaque. Circ and RCA normal. No LVG. EDP 30, wedge 20, RA 14.  
Echo 2d adult 2/2011 - LVH, EF 60% Echo 2d adult 9/1/11- LVH, EF 45%, mild NEFTALY Lexiscan cardiolite 9/10/13 - normal perfusion, EF 47% Echo 2/5/14 - EF 65%, grade 3 diastolic dysfunction, ventricular septal paradoxical motion, mild to mod LAE, mild MR, mod PA HTN. Echo 7/11/16 - EF 40 % to 45 %. Moderate hypokinesis of the apical wall(s). Moderate LAE. Dilated right atrium. Mild MAC with mild to moderate MR. AoV sclerosis without stenosis. Moderate TR with PASP 51mmHg. Moderate Pulm HTN. Interval decline in LVEF noted. PA systolic pressure has increased when compared to 05-Feb-2014. Lexiscan Cardiolite 7/11/16 - Normal perfusion. EF 51%. 3/10/17- [facility] pacemaker generator change 3/10/17 per Dr. Joie Viveros Echo 8/14/17- LVEF 42%, LVH, moderate LAE Echo 8/17/18 - EF 45%, moderate-severe LAE, PA systolic 50 HPI Mr. Erin Mosher reports intermittent episodes of shortness of breath, which he attributes to being less active recently due to the colder weather. 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 His lipids are managed by Simran Lopez MD.  
 
Patient denies any exertional chest pain, coughing, wheezing, palpitations, syncope, orthopnea, edema or paroxysmal nocturnal dyspnea. Current Outpatient Medications Medication Sig Dispense Refill  warfarin (COUMADIN) 5 mg tablet TAKE 1 TABLET DAILY OR AS DIRECTED 90 Tab 1  
 furosemide (LASIX) 40 mg tablet Take 20 mg by mouth daily.  ergocalciferol (VITAMIN D2) 50,000 unit capsule Take 50,000 Units by mouth Once every 2 weeks.  labetalol (NORMODYNE) 200 mg tablet TAKE 1 TABLET TWICE A  Tab 3  
 ethacrynic acid (EDECRIN) 25 mg tablet Take 50 mg by mouth daily.  cloNIDine HCl (CATAPRES) 0.2 mg tablet Take 1 Tab by mouth two (2) times a day. 180 Tab 3  FERROUS SULFATE Take 65 mg by mouth.  levothyroxine (SYNTHROID) 50 mcg tablet Take  by mouth Daily (before breakfast).  sodium polystyrene (SPS) 15 gram/60 mL suspension Take 15 g by mouth See Admin Instructions. 3 times weekly  calcitRIOL (ROCALTROL) 0.25 mcg capsule Take 0.25 mcg by mouth daily.  losartan (COZAAR) 25 mg tablet Take 50 mg by mouth daily.  aspirin 81 mg tablet Take  by mouth daily.  simvastatin (ZOCOR) 20 mg tablet Take  by mouth nightly. Allergies Allergen Reactions  Iron Anaphylaxis  Bumex [Bumetanide] Rash Retired  Review of Systems Constitutional: Negative for fever, chills, malaise/fatigue and diaphoresis. Respiratory: Negative for cough, hemoptysis, sputum production, and wheezing. Positive for TIMMONS. Cardiovascular: Negative for chest pain, palpitations, orthopnea, claudication, leg swelling and PND. Gastrointestinal: Negative for heartburn, nausea, vomiting, blood in stool and melena. Genitourinary: Negative for dysuria and flank pain. Musculoskeletal: Negative for joint pain and back pain. Skin: Negative for rash. Neurological: Negative for focal weakness, seizures, loss of consciousness, weakness and headaches. Endo/Heme/Allergies: Does not bruise/bleed easily. Psychiatric/Behavioral: Negative for memory loss. The patient does not have insomnia. Visit Vitals /60 (BP 1 Location: Left arm, BP Patient Position: Sitting) Pulse 67 Ht 5' 9.5\" (1.765 m) Wt 197 lb 9.6 oz (89.6 kg) SpO2 97% BMI 28.76 kg/m² Wt Readings from Last 3 Encounters:  
02/26/19 197 lb 9.6 oz (89.6 kg) 08/17/18 202 lb 11.2 oz (91.9 kg) 05/23/18 204 lb (92.5 kg) Physical Exam  
Vitals reviewed. Constitutional: He is oriented to person, place, and time. He appears well-developed. Head: Normocephalic. Neck: Neck supple. No JVD present. No tracheal deviation present. Cardiovascular: Normal rate, regular rhythm, normal heart sounds and intact distal pulses. Exam reveals no gallop and no friction rub. No murmur heard. Sternotomy scar well healed. Pulmonary/Chest: left basal crackles. Abdominal: Bowel sounds are normal. No tenderness. He has no rebound. Musculoskeletal: He exhibits no edema Neurological: He is alert and oriented to person, place, and time. Skin: Skin is warm and dry. Psychiatric: He has a normal mood and affect. Cardiographics Echo March 2013 - EF 50%, prominent septal dyssynchrony Echo 2/5/14 - EF 40%, prominent septal dyssynchrony, PA sys 50 CXR 1/27/14 - bilateral effusions, left > right Labs 1/28/13 - , Cr 2.03, K 4.2 EKG 8/9/16 - V paced with likely underlying AF Echo 8/14/17- LVEF 42%, LVH, moderate LAE Echo 8/17/18 - EF 45%, moderate-severe LAE, PA systolic 50 EKG 2/26/19 - Ventricular paced rhythm, underlying AF 
 
ASSESSMENT and PLAN Encounter Diagnoses Name Primary?  Ischemic cardiomyopathy Yes  Chronic atrial fibrillation ([facility] [address]  Pacemaker  Coronary artery disease involving native coronary artery of native heart without angina pectoris  Dyslipidemia  Cardiac pacemaker in situ  Venous insufficiency  Chronic diastolic heart failure ([address] CAD s/p CABG 2011. Stress nuclear study 7/2016 was normal. He has no sxs of angina. Continue aspirin 81 mg daily plus statin therapy. Chronic AF/conduction disease s/p PPM. Chronically RV paced. Pacer function has been normal. Continue Warfarin, target INR 2-3. He is followed by our Coumadin clinic. HFpEF class 2. EF has been in the 40-45% range, repeat echo in 6 months. LV dysfunction likely due to chronic RV pacing. HTN, controlled on combination therapy. CKD, stage 4. Followed by Dr. Luba Zelaya. CRE 4/2018 3.2. Dyslipidemia and DM managed by Donovan Hollingsworth MD. No recent labs available. Follow-up Disposition: 
Return in about 6 months (around 8/26/2019). Written by [employer], as dictated by Dr. Mel Santos.   
 
Mel Santos MD

## 2019-02-26 NOTE — PROGRESS NOTES
Patient has shortness of breath Left foot is swollen per patient Visit Vitals /60 (BP 1 Location: Left arm, BP Patient Position: Sitting) Pulse 67 Ht 5' 9.5\" (1.765 m) Wt 197 lb 9.6 oz (89.6 kg) SpO2 97% BMI 28.76 kg/m²

## 2019-06-01 PROBLEM — K92.2 GI BLEED: Status: ACTIVE | Noted: 2019-01-01

## 2019-06-01 PROBLEM — E87.5 HYPERKALEMIA: Status: ACTIVE | Noted: 2019-01-01

## 2019-06-01 PROBLEM — D62 ACUTE BLOOD LOSS ANEMIA: Status: ACTIVE | Noted: 2019-01-01

## 2019-06-01 PROBLEM — R57.9 SHOCK (HCC): Status: ACTIVE | Noted: 2019-01-01

## 2019-06-01 PROBLEM — N18.6 ESRD (END STAGE RENAL DISEASE) (HCC): Status: ACTIVE | Noted: 2019-01-01

## 2019-06-01 NOTE — ED NOTES
Pt arrives via EMS for weakness for several months that became worse last night with nausea and vomiting this a.m. Denies fevers.  Pt has stage 5 kidney disease

## 2019-06-01 NOTE — ED PROVIDER NOTES
This is an 80-year-old male who presents by way of EMS for complaint of increased weakness, nausea and vomiting. Patient states his weakness started a couple months ago and got worse today. Patient states he went out to dinner last night with his wife, came home and developed acute onset nausea and vomiting throughout the night and into today. Patient states he has vomited numerous times. Denies any fever or chills, denies chest pain. States he is a little more short of breath at rest. Denies dizziness. Denies any urinary urgency or frequency. States last bowel movement was this morning and it is darker than normal. Patient has a history of stage V kidney disease which he follows up with Dr. Diony Leong. He is not on dialysis. There no further complaints at this time. Chuckie Vang MD  Past Medical History:  No date: Advanced heart block      Comment:  [facility] [employer] pacemaker Jan 2011  No date: Atrial fibrillation [facility])      Comment:  discovered Sept 2011  No date: CAD (coronary artery disease), native coronary artery      Comment:  Diastolic HF, no angina, 2v CABG 3/2/11 [address]),                LIMA-LAD, VG-OM  No date: Chronic kidney disease  No date: Hypercholesterolemia  No date: Hypertension  3/2011: S/P CABG x 2      Comment:  Dr Whit Krishna  No date: Symptomatic bradycardia      Comment:  pacer  Past Surgical History:  2/24/11: CARDIAC CATHETERIZATION      Comment:  Ostial LM 60%, documented by IVUS, mild LAD plaque. Circ               and RCA normal. No LVG.  EDP 30, wedge 20, RA 14.   2/2011: ECHO 2D ADULT      Comment:  LVH, EF 60%  9/1/11: ECHO 2D ADULT      Comment:  LVH, EF 45%, mild NEFTALY             Past Medical History:   Diagnosis Date    Advanced heart block     [address] Robert pacemaker Jan 2011    Atrial fibrillation [facility])     discovered Sept 2011    CAD (coronary artery disease), native coronary artery     Diastolic HF, no angina, 2v CABG 3/2/11 (Angelina Dodson [address] [place] [address]), LIMA-LAD, VG-OM    Chronic kidney disease     Hypercholesterolemia     Hypertension     S/P CABG x 2 3/2011    Dr Casey Lopez Symptomatic bradycardia     pacer       Past Surgical History:   Procedure Laterality Date    CARDIAC CATHETERIZATION  11    Ostial LM 60%, documented by IVUS, mild LAD plaque. Circ and RCA normal. No LVG. EDP 30, wedge 20, RA 14.     ECHO 2D ADULT  2011    LVH, EF 60%    ECHO 2D ADULT  11    LVH, EF 45%, mild NEFTALY         No family history on file.     Social History     Socioeconomic History    Marital status:      Spouse name: Not on file    Number of children: Not on file    Years of education: Not on file    Highest education level: Not on file   Occupational History    Not on file   Social Needs    Financial resource strain: Not on file    Food insecurity:     Worry: Not on file     Inability: Not on file    Transportation needs:     Medical: Not on file     Non-medical: Not on file   Tobacco Use    Smoking status: Former Smoker     Last attempt to quit: 1988     Years since quittin.4    Smokeless tobacco: Never Used    Tobacco comment: stopped    Substance and Sexual Activity    Alcohol use: No    Drug use: Not on file    Sexual activity: Not on file   Lifestyle    Physical activity:     Days per week: Not on file     Minutes per session: Not on file    Stress: Not on file   Relationships    Social connections:     Talks on phone: Not on file     Gets together: Not on file     Attends Scientologist service: Not on file     Active member of club or organization: Not on file     Attends meetings of clubs or organizations: Not on file     Relationship status: Not on file    Intimate partner violence:     Fear of current or ex partner: Not on file     Emotionally abused: Not on file     Physically abused: Not on file     Forced sexual activity: Not on file   Other Topics Concern    Not on file   Social History Narrative    Not on file         ALLERGIES: Iron and Bumex [bumetanide]    Review of Systems   Constitutional: Positive for activity change and fatigue. Negative for appetite change, chills and fever. HENT: Negative for congestion, ear discharge, ear pain, sinus pressure, sinus pain, sore throat and trouble swallowing. Eyes: Negative for photophobia, pain, redness, itching and visual disturbance. Respiratory: Positive for shortness of breath. Negative for chest tightness. Cardiovascular: Negative for chest pain and palpitations. Gastrointestinal: Positive for blood in stool, nausea and vomiting. Negative for abdominal distention and abdominal pain. Endocrine: Negative. Genitourinary: Negative for difficulty urinating, frequency and urgency. Musculoskeletal: Negative for back pain, neck pain and neck stiffness. Skin: Positive for pallor. Negative for color change, rash and wound. Allergic/Immunologic: Negative. Neurological: Positive for weakness. Negative for dizziness, syncope and headaches. Hematological: Does not bruise/bleed easily. Psychiatric/Behavioral: Negative for behavioral problems. The patient is not nervous/anxious. Vitals:    06/01/19 1831 06/01/19 1845 06/01/19 1900 06/01/19 1915   BP: (!) 107/37 (!) 107/36 93/42 (!) 95/39   Pulse: 65 63 60 65   Resp: 19 20 17 18   Temp: 97.6 °F (36.4 °C)      SpO2: 97%   96%   Weight: 88 kg (194 lb)      Height: 5' 9\" (1.753 m)               Physical Exam   Constitutional: He is oriented to person, place, and time. He appears well-developed and well-nourished. No distress. Acutely ill appearing male, tearful   HENT:   Head: Normocephalic and atraumatic. Right Ear: External ear normal.   Left Ear: External ear normal.   Nose: Nose normal.   Mouth/Throat: Oropharynx is clear and moist.   Eyes: Pupils are equal, round, and reactive to light. Conjunctivae and EOM are normal. Right eye exhibits no discharge. Left eye exhibits no discharge. Neck: Normal range of motion. Neck supple.  No JVD present. No tracheal deviation present. Cardiovascular: Regular rhythm, normal heart sounds and intact distal pulses. Exam reveals no gallop. No murmur heard. AV paced     Pulmonary/Chest: Effort normal. No respiratory distress. He has no wheezes. He has no rales. He exhibits no tenderness. Diminished breath sounds throughout   Abdominal: Soft. Bowel sounds are normal. He exhibits no distension. There is no tenderness. There is no rebound and no guarding. Genitourinary: Rectal exam shows guaiac positive stool. Genitourinary Comments:  negative     Musculoskeletal: Normal range of motion. He exhibits no edema or tenderness. Neurological: He is alert and oriented to person, place, and time. Skin: Skin is warm and dry. No rash noted. No erythema. There is pallor. Psychiatric: He has a normal mood and affect. His behavior is normal. Judgment and thought content normal.   Nursing note and vitals reviewed. MDM  Number of Diagnoses or Management Options  Acute hyperkalemia: new and requires workup  Elevated brain natriuretic peptide (BNP) level: new and requires workup  Fatigue, unspecified type: new and requires workup  Kidney disease: new and requires workup  Symptomatic anemia: new and requires workup  Diagnosis management comments: Plan:  Admit to the hospitalist service for further evaluation and treatment. Patient and family in agreement with plan of care. Amount and/or Complexity of Data Reviewed  Clinical lab tests: ordered and reviewed  Tests in the radiology section of CPT®: ordered and reviewed  Discuss the patient with other providers: yes (Discussed plan of care with Dr. Chiquis greer  )    Risk of Complications, Morbidity, and/or Mortality  Presenting problems: high  Diagnostic procedures: high  Management options: high    Critical Care  Total time providing critical care: 30-74 minutes (35 mins  )         Procedures    ED EKG interpretation: 7:27 PM  Rhythm: paced; and regular . Rate (approx.): 68; Axis: normal; P wave: normal; QRS interval: normal ; ST/T wave: normal; Other findings: abnormal ekg. This EKG was interpreted by Loreto Haas MD,ED Provider. 8:17 PM  Spoke with Dr. Abril Amaya who will admit to the hospitalist service. Patient and family in agreement with plan of care. Dr. Crystal Hernández at bedside. 8:30 PM  Spoke with nephrology about the plan of care. Dr. Abril Amaya discussing treatment plan with family.

## 2019-06-02 NOTE — ACP (ADVANCE CARE PLANNING)
Advance Care Planning Note    Name: Feng John  YOB: 1932  MRN: [id number]  Admission Date: 6/1/2019  6:15 PM    Date of discussion: 6/1/2019    Active Diagnoses:    Hospital Problems  Date Reviewed: 5/23/2018          Codes Class Noted POA    Hyperkalemia ICD-10-CM: E87.5  ICD-9-CM: 276.7  6/1/2019 Yes        Sh[facility] ([facility] ICD-10-CM: R57.9  ICD-9-CM: 785.50  6/1/2019 Yes        Acute blood loss anemia ICD-10-CM: D62  ICD-9-CM: 285.1  6/1/2019 Yes        GI bleed ICD-10-CM: K92.2  ICD-9-CM: 578.9  6/1/2019 Yes        ESRD (end stage renal disease) ([facility] ICD-10-CM: N18.6  ICD-9-CM: 585.6  6/1/2019 Yes        Chronic atrial fibrillation (HCC) ICD-10-CM: I48.2  ICD-9-CM: 427.31  8/20/2017 Yes        Pacemaker ICD-10-CM: Z95.0  ICD-9-CM: V45.01  2/12/2017 Yes        Dyslipidemia ICD-10-CM: E78.5  ICD-9-CM: 272.4  7/6/2015 Yes        Chronic diastolic heart failure ([facility] ICD-10-CM: I50.32  ICD-9-CM: 428.32  4/4/2011 Yes        Coronary artery disease (Chronic) ICD-10-CM: I25.10  ICD-9-CM: 414.00  3/1/2011 Yes    Overview Addendum 1/4/2012  3:10 PM by Ce Crowe MD     Diastolic HF, no angina, 2v CABG 3/2/11 [facility]), PETERSEN-LAD, VG-OM             Heart block ICD-10-CM: I45.9  ICD-9-CM: 426.9  9/17/2010 Yes    Overview Addendum 4/7/2011  8:00 AM by Ce Crowe MD     [facility] pacemaker Jan 2011                        These active diagnoses are of sufficient risk that focused discussion on advance care planning is indicated in order to allow the patient to thoughtfully consider personal goals of care, and if situations arise that prevent the ability to personally give input, to ensure appropriate representation of their personal desires for different levels and aggressiveness of care.      Discussion:     Persons present and participating in discussion: Kelsey Sexton DO, Wife, Daughter, Son, ED RN    Discussion: After interviewing family and patient re: patient's recent decline and HPI, daughter wanted to discuss patient's  health with me privately. She informed me that her, the family, and the patient (though he refuses to talk about any matters related to his health) have severe reservations about initiating dialysis. She informed me that patient has been accompanied by daughter to recent healthcare appointments (which is unusual) and had been discussing with patient and his PCP (Dr. Manny Mack) about his decline. During the events that led to this hospital visit, daughter reached out to Dr. Manny Mack who informed her that it appears as though patient is quickly declining and that they had plans to discuss hospice on 6/3/19. Daughter asked me about his current condition and I informed her that he was critically ill and may require ICU, central line insertion, and vasopressor therapy. She stated that's not at all what he would want and the entire family is in agreement with this. I then asked about code status and she stated patient is DNR/DNI. They are interested in pursuing hospice care but wondered about resources saying that the patient is barely functional at home. I asked if we could speak with rest of family and patient which she agreed. At bedside with patient, he was very tearful as I stated that he was \"pretty sick\" and he stated \"I know. I know. I just can't hear it, I can't hear what you're about to tell me. \" I asked if he was sure (he was) and that if he couldn't hear it that he trusted his wife, daughter, and son to help him make this decision and he agreed. Reviewed case with family, gave prognosis as critical with hours to days to live, they want only comfort measures and asked that they be able to discuss it with the patient further. I agreed to [place] back later in the evening to offer advice and discuss plan further with patient if he wished.       Time Spent:     Total time spent face-to-face in education and discussion: 60 minutes.      Heather Guido DO  6/1/2019  10:59 PM

## 2019-06-02 NOTE — PROGRESS NOTES
Patient passed dr Ame Mcallister called to room and pronounced patient life net called patient not a candidate for donation and body released     Nursing supervisor aware of death

## 2019-06-02 NOTE — HOSPICE
[facility]  [phone]     Patient Name: Ed Montoya  YOB: 1932  Age: 80 y.o. [facility] RN Note:  Hospice consult received, reviewing chart. Will follow up with Unit Nurse and Care Manager to discuss plan of care, patient status and discharge disposition. Hospice Liaison called to set up a meeting time with family and patient has passed away. Thank you for the opportunity to be of service to this patient.

## 2019-06-02 NOTE — PROGRESS NOTES
Patient unresponsive. Pupils fixed. No spontaneous respirations. No heart sounds auscultated.     Death pronounced at 13:40

## 2019-06-02 NOTE — H&P
[facility]    Hospitalist Admission Note      NAME:  Barbara Ferrari   :   1932   MRN:  [id number]     PCP:  Sebastian Renee MD     Date/Time:  2019 10:47 PM          Subjective:     CHIEF COMPLAINT: weakness     HISTORY OF PRESENT ILLNESS:     Mr. Bud Farmer is a 80 y.o.  male with a hx of CAD s/p CABG, CHB s/p PPM, AFib, CKD stage 5 who presented to the Emergency Department complaining of several weeks of declining health and progressive weakness superimposed on nausea, vomiting, little to no PO intake and within last 24 hours several episodes of dark, tarry stools. Patient had CKD5, not on dialysis, follows with Dr. Estela Nicole. Patient remains visible upset and has difficulty engaging at all but appears weak and severely ill. We will admit for further management. Past Medical History:   Diagnosis Date    Advanced heart block     [facility] pac[facility] 2011    Atrial fibrillation [facility])     discovered 2011    CAD (coronary artery disease), native coronary artery     Diastolic HF, no angina, 2v CABG 3/2/11 [address]), LIMA-LAD, VG-OM    Chronic kidney disease     Hypercholesterolemia     Hypertension     S/P CABG x 2 3/2011    Dr Renzo Garcia Symptomatic bradycardia     pacer        Past Surgical History:   Procedure Laterality Date    CARDIAC CATHETERIZATION  11    Ostial LM 60%, documented by IVUS, mild LAD plaque. Circ and RCA normal. No LVG.  EDP 30, wedge 20, RA 14.     ECHO 2D ADULT  2011    LVH, EF 60%    ECHO 2D ADULT  11    LVH, EF 45%, mild NEFTALY       Social History     Tobacco Use    Smoking status: Former Smoker     Last attempt to quit: 1988     Years since quittin.4    Smokeless tobacco: Never Used    Tobacco comment: stopped    Substance Use Topics    Alcohol use: No        Family hx cannot be fully assessed, due to the admitting conditions    Allergies   Allergen Reactions    Iron Anaphylaxis     Had anaphylactic reaction during iron IV infusion at   Tolerates iron tablets without issue    Bumex [Bumetanide] Rash        Prior to Admission medications    Medication Sig Start Date End Date Taking? Authorizing Provider   ferrous sulfate 325 mg (65 mg iron) tablet Take 325 mg by mouth daily (with dinner). Yes Provider, Historical   levothyroxine (SYNTHROID) 75 mcg tablet Take 75 mcg by mouth Daily (before breakfast). Yes Provider, Historical   losartan (COZAAR) 100 mg tablet Take 100 mg by mouth daily. Yes Provider, Historical   allopurinol (ZYLOPRIM) 100 mg tablet Take 100 mg by mouth daily. Yes Provider, Historical   patiromer calcium sorbitex (VELTASSA) 8.4 gram powder Take 8.4 g by mouth every Monday, Wednesday, Friday. Yes Provider, Historical   diphenhydrAMINE (BENADRYL) 25 mg capsule Take 25 mg by mouth nightly as needed for Sleep. Yes Provider, Historical   warfarin (COUMADIN) 5 mg tablet TAKE 1 TABLET DAILY OR AS DIRECTED 5/31/19  Yes Maddy Jones NP   labetalol (NORMODYNE) 200 mg tablet TAKE 1 TABLET TWICE A DAY 3/25/19  Yes Maddy Jones NP   furosemide (LASIX) 40 mg tablet Take 40 mg by mouth daily. Yes Provider, Historical   ergocalciferol (VITAMIN D2) 50,000 unit capsule Take 50,000 Units by mouth Once every 2 weeks. Yes Provider, Historical   ethacrynic acid (EDECRIN) 25 mg tablet Take 50 mg by mouth daily. Yes Provider, Historical   cloNIDine HCl (CATAPRES) 0.2 mg tablet Take 1 Tab by mouth two (2) times a day. 11/11/15  Yes Cindy Hurd MD   calcitRIOL (ROCALTROL) 0.25 mcg capsule Take 0.25 mcg by mouth daily. Yes Provider, Historical   aspirin 81 mg tablet Take 81 mg by mouth daily. 10/18/10  Yes Provider, Historical   simvastatin (ZOCOR) 20 mg tablet Take 20 mg by mouth nightly.    Yes Provider, Historical       Review of Systems:  Unable to obtain due to current medical condition       Objective:      VITALS:    Vital signs reviewed; most recent are:    Visit Vitals  BP (!) 131/37 Pulse 77   Temp 97.6 °F (36.4 °C)   Resp 19   Ht 5' 9\" (1.753 m)   Wt 88 kg (194 lb)   SpO2 100%   BMI 28.65 kg/m²     SpO2 Readings from Last 6 Encounters:   06/01/19 100%   02/26/19 97%   08/17/18 99%   05/23/18 97%   02/13/18 99%   08/14/17 100%            Intake/Output Summary (Last 24 hours) at 6/1/2019 2247  Last data filed at 6/1/2019 2122  Gross per 24 hour   Intake 1000 ml   Output    Net 1000 ml        Exam:     Physical Exam:    Gen:  Elderly, frail, in acute distress  HEENT:  Pale conjunctivae, PERRL, hearing intact to voice, dry mucous membranes  Neck:  Supple, without masses  Resp:  No accessory muscle use, coarse bibasilar breath sounds. Card:  No murmurs, normal S1, S2 without thrills, bruits or peripheral edema  Abd:  Soft, non-tender, non-distended, normoactive bowel sounds are present  Lymph:  No cervical or inguinal adenopathy  Musc:  No cyanosis or clubbing  Skin:  No rashes or ulcers, skin turgor is global motor weakness, follows commands appropriately  Psych:  Poor insight, oriented to person, place and time, anxious/upset     Labs:    Recent Labs     06/01/19 1827   WBC 10.5   HGB 7.1*   HCT 22.2*        Recent Labs     06/01/19 1928 06/01/19 1827   NA  --  139   K 6.4* HEMOLYZED,RECOLLECT REQUESTED   CL  --  113*   CO2  --  18*   GLU  --  132*   BUN  --  126*   CREA  --  5.81*   CA  --  8.8   ALB  --  2.6*   TBILI  --  0.5   SGOT  --  27   ALT  --  15     Lab Results   Component Value Date/Time    Glucose (POC) 137 (H) 03/05/2011 09:11 AM    Glucose (POC) 130 (H) 03/05/2011 06:00 AM     No results for input(s): PH, PCO2, PO2, HCO3, FIO2 in the last 72 hours.   Recent Labs     06/01/19 1929   INR 5.2*     All Micro Results     Procedure Component Value Units Date/Time    CULTURE, BLOOD, PAIRED [[id number]] Collected:  06/01/19 2100    Order Status:  Completed Specimen:  Blood Updated:  06/01/19 2146    URINE CULTURE HOLD SAMPLE [[id number]]     Order Status:  Sent Specimen:  Urine I have reviewed previous records       Assessment and Plan: Active Problems:    Acute blood loss anemia / GI bleed. POA. Grossly positive FOBT with melanotic diarrhea. Associated with hypovolemic shock on presentation and severely decreased diastolic BP albeit nml lactic acid. Hgb 7.1,  leading to uremic plts, INR elevated, unclear cause. Discussed need for central venous access and likely ICU and pressors but family/patient declined this (see separate ACP) and favor transitioning to comfort measures and pursuing hospice. ESRD / Uremia / Hyperkalemia. Last known SCr was 4.9, now 5.8 with K+ greater than 6 and BUN well over 100 (with uremia and GI bleed). Family and patient had severe reservations about pursuing dialysis to begin with and patient had not gone to education classes yet. Nephrology consulted. Patient/family pursuing comfort measures. Heart block (9/17/2010) / S/p PPM placement in 2011. Pacer functioning. Coronary artery disease s/p 2v CABG 3/2/11 [facility]), LIMA-LAD, VG-OM / Chronic diastolic congestive heart failure, NYHA class 3. POA. Progressively worse. Pursuing comfort measures at present    Dyslipidemia (7/6/2015). Cant stop statin    Chronic atrial fibrillation ([facility] (8/20/2017). Can stop coumadin. Weakness / Debility / Failure to thrive. POA. Patient's functional status is severely declined, albumin 2.6, barely able to move around house without DME and person assist.       Telemetry reviewed:   V-paced    Risk of deterioration: high      Total time spent with patient: 79 Minutes I reviewed chart, notes, data and current medications in the medical record. I have examined and treated the patient at bedside during this period.                  Care Plan discussed with: Patient, Family, Nursing Staff and >50% of time spent in counseling and coordination of care    Discussed:  Code Status, Care Plan and D/C Planning       ___________________________________________________    Attending Physician: Julius Cranker, DO

## 2019-06-02 NOTE — PROGRESS NOTES
[facility]: MED RECONCILIATION    Medications added:     Allopurinol 100 mg PO daily (at noon)  Veltassa 8.4 gm three times a week for high potassium  Diphenhydramine 25 mg PO QHS PRN sleep    Medications removed:    SPS TIW (now taking veltassa)    Medications adjusted:    Furosemide 40 mg PO daily at noon (instead of 20 mg)  Levothyroxine 75 mcg PO daily (instead of 50 mcg)  Losartan 100 mg PO daily (instead of 50 mg)    Information obtained from: patient, patient's family, medication list provided, Rx query    Significant PMH/Disease States:   Past Medical History:   Diagnosis Date    Advanced heart block     [employer] pacemaker Jan 2011    Atrial fibrillation [facility])     discovered Sept 2011    CAD (coronary artery disease), native coronary artery     Diastolic HF, no angina, 2v CABG 3/2/11 ([facility], [facility]    Chronic kidney disease     Hypercholesterolemia     Hypertension     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 Allergies: Iron and Bumex [bumetanide]    Prior to Admission Medications:     Medication Documentation Review Audit       Reviewed by JOAN HenryD (Pharmacist) on 06/01/19 at 2100      Medication Sig Documenting Provider Last Dose Status Taking?   allopurinol (ZYLOPRIM) 100 mg tablet Take 100 mg by mouth daily. Provider, Historical 5/31/2019 noon Active Yes   aspirin 81 mg tablet Take 81 mg by mouth daily. Provider, Historical 5/31/2019 am Active Yes   calcitRIOL (ROCALTROL) 0.25 mcg capsule Take 0.25 mcg by mouth daily. Provider, Historical 5/31/2019 am Active Yes   cloNIDine HCl (CATAPRES) 0.2 mg tablet Take 1 Tab by mouth two (2) times a day. Jonah Mcdonald MD 5/31/2019 pm Active Yes   diphenhydrAMINE (BENADRYL) 25 mg capsule Take 25 mg by mouth nightly as needed for Sleep.  Provider, Historical  Active Yes   ergocalciferol (VITAMIN D2) 50,000 unit capsule Take 50,000 Units by mouth Once every 2 weeks. Provider, Historical 5/25/2019 Unknown time Active Yes   ethacrynic acid (EDECRIN) 25 mg tablet Take 50 mg by mouth daily. Provider, Historical 5/31/2019 noon Active Yes   ferrous sulfate 325 mg (65 mg iron) tablet Take 325 mg by mouth daily (with dinner). Provider, Historical 5/31/2019 dinner Active Yes   furosemide (LASIX) 40 mg tablet Take 40 mg by mouth daily. Provider, Historical 5/31/2019 noon Active Yes   labetalol (NORMODYNE) 200 mg tablet TAKE 1 TABLET TWICE A DAY Gamal Garduno NP 5/31/2019 pm Active Yes   levothyroxine (SYNTHROID) 75 mcg tablet Take 75 mcg by mouth Daily (before breakfast). Provider, Historical 5/31/2019 am Active Yes   losartan (COZAAR) 100 mg tablet Take 100 mg by mouth daily. Provider, Historical 5/31/2019 am Active Yes   patiromer calcium sorbitex (VELTASSA) 8.4 gram powder Take 8.4 g by mouth every Monday, Wednesday, Friday. Provider, Historical 5/31/2019 Unknown time Active Yes   simvastatin (ZOCOR) 20 mg tablet Take 20 mg by mouth nightly. Provider, Historical 5/31/2019 pm Active Yes   warfarin (COUMADIN) 5 mg tablet TAKE 1 TABLET DAILY OR AS DIRECTED Gamal Garduno NP 5/31/2019 pm Active Yes           Med Note MANPREET Durand   Sat Jun 1, 2019  8:58 PM) INR 5.2 on admission 6/1/19                    Thank you for the consult,  [facility]

## 2019-06-02 NOTE — DISCHARGE SUMMARY
Pt was admitted with acute blood loss anemia/ GI bleed and hypovolemic shock. Family and pt refused ICU admission, further workup and central line access. Pt was admitted for comfort care. Pt  at 13:40.      Likely cause of death: hypovolemic shock, acidosis, electrolytes abnormalities

## 2019-06-02 NOTE — ROUTINE PROCESS
TRANSFER - OUT REPORT:    Verbal report given to romelia on Mani Sandifer  being transferred to  for routine progression of care       Report consisted of patients Situation, Background, Assessment and   Recommendations(SBAR). Information from the following report(s) SBAR, ED Summary, [facility] and Recent Results was reviewed with the receiving nurse. Opportunity for questions and clarification was provided.

## 2019-06-02 NOTE — PROGRESS NOTES
Pt of Dr Zee Edwards: recently worsening Cr:4.9 mg earlier this month. H/o high K. Now with 5.6 Cr. He was encouraged for CKD classes. He has received medical treatment.  Repeat K .

## 2019-06-06 LAB
BACTERIA SPEC CULT: NORMAL
SERVICE CMNT-IMP: NORMAL